# Patient Record
Sex: FEMALE | Race: ASIAN | URBAN - METROPOLITAN AREA
[De-identification: names, ages, dates, MRNs, and addresses within clinical notes are randomized per-mention and may not be internally consistent; named-entity substitution may affect disease eponyms.]

---

## 2019-02-12 ENCOUNTER — PATIENT OUTREACH (OUTPATIENT)
Dept: FAMILY MEDICINE CLINIC | Facility: CLINIC | Age: 59
End: 2019-02-12

## 2019-02-12 NOTE — PROGRESS NOTES
Patient's LOV 03/2013 with Dr. Lizzette Menjivar. Please find out if Dr. Lizzette Menjivar is still patient's PCP- if yes, she needs to follow up to reestablish care. If she is under the care of another PCP- please have Dr. Kaleb Bo name removed from her chart.  Thank you

## 2019-03-13 NOTE — PROGRESS NOTES
Please try to outreach patient again to see if we can get through on phone number in chart. Needs to establish, has been since 2013 since seeing Dr. Katherine Hernandez.

## 2019-03-14 NOTE — PROGRESS NOTES
Pt preferred phone number in chart rang continuously and did not connect to a voicemail. Pt work phone number was for a [employer] store.

## 2021-03-25 ENCOUNTER — IMMUNIZATION (OUTPATIENT)
Dept: LAB | Age: 61
End: 2021-03-25

## 2021-03-25 DIAGNOSIS — Z23 NEED FOR VACCINATION: Primary | ICD-10-CM

## 2021-03-25 PROCEDURE — 0001A COVID 19 PFIZER-BIONTECH: CPT

## 2021-03-25 PROCEDURE — 91300 COVID 19 PFIZER-BIONTECH: CPT

## 2021-04-15 ENCOUNTER — IMMUNIZATION (OUTPATIENT)
Dept: LAB | Age: 61
End: 2021-04-15
Attending: HOSPITALIST

## 2021-04-15 DIAGNOSIS — Z23 NEED FOR VACCINATION: Primary | ICD-10-CM

## 2021-04-15 PROCEDURE — 91300 COVID 19 PFIZER-BIONTECH: CPT | Performed by: NURSE PRACTITIONER

## 2021-04-15 PROCEDURE — 0002A COVID 19 PFIZER-BIONTECH: CPT | Performed by: NURSE PRACTITIONER

## 2022-09-12 ENCOUNTER — OFFICE VISIT (OUTPATIENT)
Dept: FAMILY MEDICINE CLINIC | Facility: CLINIC | Age: 62
End: 2022-09-12
Payer: COMMERCIAL

## 2022-09-12 VITALS
RESPIRATION RATE: 16 BRPM | DIASTOLIC BLOOD PRESSURE: 80 MMHG | OXYGEN SATURATION: 99 % | WEIGHT: 131 LBS | HEART RATE: 85 BPM | BODY MASS INDEX: 22.09 KG/M2 | HEIGHT: 64.5 IN | SYSTOLIC BLOOD PRESSURE: 126 MMHG

## 2022-09-12 DIAGNOSIS — Z13.29 SCREENING FOR ENDOCRINE, METABOLIC AND IMMUNITY DISORDER: ICD-10-CM

## 2022-09-12 DIAGNOSIS — Z71.85 VACCINE COUNSELING: ICD-10-CM

## 2022-09-12 DIAGNOSIS — Z13.0 SCREENING FOR ENDOCRINE, METABOLIC AND IMMUNITY DISORDER: ICD-10-CM

## 2022-09-12 DIAGNOSIS — E55.9 VITAMIN D DEFICIENCY: ICD-10-CM

## 2022-09-12 DIAGNOSIS — H25.9 AGE-RELATED CATARACT OF BOTH EYES, UNSPECIFIED AGE-RELATED CATARACT TYPE: ICD-10-CM

## 2022-09-12 DIAGNOSIS — Z23 NEED FOR VACCINATION: ICD-10-CM

## 2022-09-12 DIAGNOSIS — Z12.31 ENCOUNTER FOR SCREENING MAMMOGRAM FOR MALIGNANT NEOPLASM OF BREAST: ICD-10-CM

## 2022-09-12 DIAGNOSIS — Z00.00 BLOOD TESTS FOR ROUTINE GENERAL PHYSICAL EXAMINATION: ICD-10-CM

## 2022-09-12 DIAGNOSIS — Z12.4 SCREENING FOR CERVICAL CANCER: ICD-10-CM

## 2022-09-12 DIAGNOSIS — Z13.228 SCREENING FOR ENDOCRINE, METABOLIC AND IMMUNITY DISORDER: ICD-10-CM

## 2022-09-12 DIAGNOSIS — Z11.59 ENCOUNTER FOR HCV SCREENING TEST FOR HIGH RISK PATIENT: ICD-10-CM

## 2022-09-12 DIAGNOSIS — Z12.11 SCREENING FOR COLON CANCER: ICD-10-CM

## 2022-09-12 DIAGNOSIS — Z00.00 ROUTINE GENERAL MEDICAL EXAMINATION AT A HEALTH CARE FACILITY: Primary | ICD-10-CM

## 2022-09-12 DIAGNOSIS — E78.5 DYSLIPIDEMIA: ICD-10-CM

## 2022-09-12 DIAGNOSIS — E89.0 POSTOPERATIVE HYPOTHYROIDISM: ICD-10-CM

## 2022-09-12 DIAGNOSIS — Z13.820 SCREENING FOR OSTEOPOROSIS: ICD-10-CM

## 2022-09-12 DIAGNOSIS — Z85.850 HISTORY OF THYROID CANCER: ICD-10-CM

## 2022-09-12 DIAGNOSIS — Z91.89 ENCOUNTER FOR HCV SCREENING TEST FOR HIGH RISK PATIENT: ICD-10-CM

## 2022-09-12 LAB
BILIRUB UR QL STRIP.AUTO: NEGATIVE
CLARITY UR REFRACT.AUTO: CLEAR
COLOR UR AUTO: YELLOW
GLUCOSE UR STRIP.AUTO-MCNC: NEGATIVE MG/DL
KETONES UR STRIP.AUTO-MCNC: NEGATIVE MG/DL
LEUKOCYTE ESTERASE UR QL STRIP.AUTO: NEGATIVE
NITRITE UR QL STRIP.AUTO: NEGATIVE
PH UR STRIP.AUTO: 6 [PH] (ref 5–8)
PROT UR STRIP.AUTO-MCNC: NEGATIVE MG/DL
RBC UR QL AUTO: NEGATIVE
SP GR UR STRIP.AUTO: >=1.03 (ref 1–1.03)
UROBILINOGEN UR STRIP.AUTO-MCNC: 0.2 MG/DL

## 2022-09-12 PROCEDURE — 81003 URINALYSIS AUTO W/O SCOPE: CPT | Performed by: FAMILY MEDICINE

## 2022-09-12 RX ORDER — LEVOTHYROXINE SODIUM 0.1 MG/1
50 TABLET ORAL
COMMUNITY

## 2022-09-15 ENCOUNTER — LABORATORY ENCOUNTER (OUTPATIENT)
Dept: LAB | Age: 62
End: 2022-09-15
Attending: FAMILY MEDICINE
Payer: COMMERCIAL

## 2022-09-15 ENCOUNTER — HOSPITAL ENCOUNTER (OUTPATIENT)
Dept: MAMMOGRAPHY | Age: 62
Discharge: HOME | End: 2022-09-15
Attending: FAMILY MEDICINE
Payer: COMMERCIAL

## 2022-09-15 ENCOUNTER — HOSPITAL ENCOUNTER (OUTPATIENT)
Dept: BONE DENSITY | Age: 62
Discharge: HOME | End: 2022-09-15
Attending: FAMILY MEDICINE
Payer: COMMERCIAL

## 2022-09-15 DIAGNOSIS — E55.9 VITAMIN D DEFICIENCY: ICD-10-CM

## 2022-09-15 DIAGNOSIS — Z91.89 ENCOUNTER FOR HCV SCREENING TEST FOR HIGH RISK PATIENT: ICD-10-CM

## 2022-09-15 DIAGNOSIS — Z12.31 ENCOUNTER FOR SCREENING MAMMOGRAM FOR MALIGNANT NEOPLASM OF BREAST: ICD-10-CM

## 2022-09-15 DIAGNOSIS — Z00.00 BLOOD TESTS FOR ROUTINE GENERAL PHYSICAL EXAMINATION: ICD-10-CM

## 2022-09-15 DIAGNOSIS — Z13.0 SCREENING FOR ENDOCRINE, METABOLIC AND IMMUNITY DISORDER: ICD-10-CM

## 2022-09-15 DIAGNOSIS — Z13.29 SCREENING FOR ENDOCRINE, METABOLIC AND IMMUNITY DISORDER: ICD-10-CM

## 2022-09-15 DIAGNOSIS — Z13.820 SCREENING FOR OSTEOPOROSIS: ICD-10-CM

## 2022-09-15 DIAGNOSIS — Z13.228 SCREENING FOR ENDOCRINE, METABOLIC AND IMMUNITY DISORDER: ICD-10-CM

## 2022-09-15 DIAGNOSIS — Z11.59 ENCOUNTER FOR HCV SCREENING TEST FOR HIGH RISK PATIENT: ICD-10-CM

## 2022-09-15 LAB
ALBUMIN SERPL-MCNC: 3.8 G/DL (ref 3.4–5)
ALBUMIN/GLOB SERPL: 1 {RATIO} (ref 1–2)
ALP LIVER SERPL-CCNC: 54 U/L
ALT SERPL-CCNC: 32 U/L
ANION GAP SERPL CALC-SCNC: 5 MMOL/L (ref 0–18)
AST SERPL-CCNC: 23 U/L (ref 15–37)
BASOPHILS # BLD AUTO: 0.04 X10(3) UL (ref 0–0.2)
BASOPHILS NFR BLD AUTO: 0.7 %
BILIRUB SERPL-MCNC: 0.6 MG/DL (ref 0.1–2)
BUN BLD-MCNC: 16 MG/DL (ref 7–18)
CALCIUM BLD-MCNC: 8.9 MG/DL (ref 8.5–10.1)
CHLORIDE SERPL-SCNC: 105 MMOL/L (ref 98–112)
CHOLEST SERPL-MCNC: 282 MG/DL (ref ?–200)
CO2 SERPL-SCNC: 26 MMOL/L (ref 21–32)
CREAT BLD-MCNC: 0.66 MG/DL
EOSINOPHIL # BLD AUTO: 0.11 X10(3) UL (ref 0–0.7)
EOSINOPHIL NFR BLD AUTO: 1.9 %
ERYTHROCYTE [DISTWIDTH] IN BLOOD BY AUTOMATED COUNT: 14.4 %
FASTING PATIENT LIPID ANSWER: YES
FASTING STATUS PATIENT QL REPORTED: YES
GFR SERPLBLD BASED ON 1.73 SQ M-ARVRAT: 99 ML/MIN/1.73M2 (ref 60–?)
GLOBULIN PLAS-MCNC: 3.9 G/DL (ref 2.8–4.4)
GLUCOSE BLD-MCNC: 86 MG/DL (ref 70–99)
HBV SURFACE AB SER QL: REACTIVE
HBV SURFACE AB SERPL IA-ACNC: >1000 MIU/ML
HCT VFR BLD AUTO: 40.5 %
HCV AB SERPL QL IA: NONREACTIVE
HDLC SERPL-MCNC: 80 MG/DL (ref 40–59)
HGB BLD-MCNC: 13.3 G/DL
IMM GRANULOCYTES # BLD AUTO: 0.01 X10(3) UL (ref 0–1)
IMM GRANULOCYTES NFR BLD: 0.2 %
LDLC SERPL CALC-MCNC: 192 MG/DL (ref ?–100)
LYMPHOCYTES # BLD AUTO: 1.75 X10(3) UL (ref 1–4)
LYMPHOCYTES NFR BLD AUTO: 29.8 %
MCH RBC QN AUTO: 30.9 PG (ref 26–34)
MCHC RBC AUTO-ENTMCNC: 32.8 G/DL (ref 31–37)
MCV RBC AUTO: 94.2 FL
MONOCYTES # BLD AUTO: 0.39 X10(3) UL (ref 0.1–1)
MONOCYTES NFR BLD AUTO: 6.6 %
NEUTROPHILS # BLD AUTO: 3.58 X10 (3) UL (ref 1.5–7.7)
NEUTROPHILS # BLD AUTO: 3.58 X10(3) UL (ref 1.5–7.7)
NEUTROPHILS NFR BLD AUTO: 60.8 %
NONHDLC SERPL-MCNC: 202 MG/DL (ref ?–130)
OSMOLALITY SERPL CALC.SUM OF ELEC: 282 MOSM/KG (ref 275–295)
PLATELET # BLD AUTO: 248 10(3)UL (ref 150–450)
POTASSIUM SERPL-SCNC: 3.8 MMOL/L (ref 3.5–5.1)
PROT SERPL-MCNC: 7.7 G/DL (ref 6.4–8.2)
RBC # BLD AUTO: 4.3 X10(6)UL
RUBV IGG SER QL: POSITIVE
RUBV IGG SER-ACNC: 57.1 IU/ML (ref 10–?)
SODIUM SERPL-SCNC: 136 MMOL/L (ref 136–145)
T4 FREE SERPL-MCNC: 0.8 NG/DL (ref 0.8–1.7)
TRIGL SERPL-MCNC: 65 MG/DL (ref 30–149)
TSI SER-ACNC: 61.6 MIU/ML (ref 0.36–3.74)
VIT D+METAB SERPL-MCNC: 22.3 NG/ML (ref 30–100)
VLDLC SERPL CALC-MCNC: 13 MG/DL (ref 0–30)
WBC # BLD AUTO: 5.9 X10(3) UL (ref 4–11)

## 2022-09-15 PROCEDURE — 86735 MUMPS ANTIBODY: CPT | Performed by: FAMILY MEDICINE

## 2022-09-15 PROCEDURE — 86803 HEPATITIS C AB TEST: CPT | Performed by: FAMILY MEDICINE

## 2022-09-15 PROCEDURE — 80050 GENERAL HEALTH PANEL: CPT | Performed by: FAMILY MEDICINE

## 2022-09-15 PROCEDURE — 86787 VARICELLA-ZOSTER ANTIBODY: CPT | Performed by: FAMILY MEDICINE

## 2022-09-15 PROCEDURE — 86765 RUBEOLA ANTIBODY: CPT | Performed by: FAMILY MEDICINE

## 2022-09-15 PROCEDURE — 77080 DXA BONE DENSITY AXIAL: CPT | Performed by: FAMILY MEDICINE

## 2022-09-15 PROCEDURE — 77067 SCR MAMMO BI INCL CAD: CPT | Performed by: FAMILY MEDICINE

## 2022-09-15 PROCEDURE — 77063 BREAST TOMOSYNTHESIS BI: CPT | Performed by: FAMILY MEDICINE

## 2022-09-15 PROCEDURE — 86762 RUBELLA ANTIBODY: CPT | Performed by: FAMILY MEDICINE

## 2022-09-15 PROCEDURE — 80061 LIPID PANEL: CPT | Performed by: FAMILY MEDICINE

## 2022-09-15 PROCEDURE — 86706 HEP B SURFACE ANTIBODY: CPT | Performed by: FAMILY MEDICINE

## 2022-09-15 PROCEDURE — 84439 ASSAY OF FREE THYROXINE: CPT | Performed by: FAMILY MEDICINE

## 2022-09-15 PROCEDURE — 82306 VITAMIN D 25 HYDROXY: CPT | Performed by: FAMILY MEDICINE

## 2022-09-16 ENCOUNTER — TELEPHONE (OUTPATIENT)
Dept: FAMILY MEDICINE CLINIC | Facility: CLINIC | Age: 62
End: 2022-09-16

## 2022-09-16 DIAGNOSIS — E03.9 HYPOTHYROIDISM, UNSPECIFIED TYPE: Primary | ICD-10-CM

## 2022-09-16 DIAGNOSIS — E55.9 VITAMIN D DEFICIENCY: ICD-10-CM

## 2022-09-16 LAB
MEV IGG SER-ACNC: >300 AU/ML (ref 16.5–?)
MUV IGG SER IA-ACNC: 15.1 AU/ML (ref 11–?)
VZV IGG SER IA-ACNC: >4000 (ref 165–?)

## 2022-09-16 RX ORDER — LEVOTHYROXINE SODIUM 0.1 MG/1
100 TABLET ORAL
Qty: 60 TABLET | Refills: 0 | Status: SHIPPED | OUTPATIENT
Start: 2022-09-16

## 2022-09-16 RX ORDER — ERGOCALCIFEROL 1.25 MG/1
50000 CAPSULE ORAL WEEKLY
Qty: 8 CAPSULE | Refills: 0 | Status: SHIPPED | OUTPATIENT
Start: 2022-09-16

## 2022-09-19 ENCOUNTER — TELEPHONE (OUTPATIENT)
Dept: FAMILY MEDICINE CLINIC | Facility: CLINIC | Age: 62
End: 2022-09-19

## 2022-09-19 RX ORDER — ERGOCALCIFEROL 1.25 MG/1
CAPSULE ORAL
Qty: 12 CAPSULE | Refills: 0 | OUTPATIENT
Start: 2022-09-19

## 2022-09-19 RX ORDER — LEVOTHYROXINE SODIUM 0.1 MG/1
TABLET ORAL
Qty: 90 TABLET | Refills: 0 | OUTPATIENT
Start: 2022-09-19

## 2022-09-19 NOTE — TELEPHONE ENCOUNTER
Pt is calling to discuss the following medication:    levothyroxine 100 MCG Oral Tab    Pt states she has been taking 50mcg per day, not the 100mcg that was prescribed. Pt would like to discuss with nurse.     Thank you

## 2022-09-20 ENCOUNTER — ORDER TRANSCRIPTION (OUTPATIENT)
Dept: ADMINISTRATIVE | Facility: HOSPITAL | Age: 62
End: 2022-09-20

## 2022-09-20 DIAGNOSIS — Z13.9 ENCOUNTER FOR SCREENING: Primary | ICD-10-CM

## 2022-09-23 ENCOUNTER — HOSPITAL ENCOUNTER (OUTPATIENT)
Dept: ULTRASOUND IMAGING | Age: 62
Discharge: HOME | End: 2022-09-23
Attending: FAMILY MEDICINE

## 2022-09-23 DIAGNOSIS — Z13.9 ENCOUNTER FOR SCREENING: ICD-10-CM

## 2022-09-23 NOTE — PROGRESS NOTES
Date of Service 9/23/2022    Zoraida Momin  Date of Birth 8/2/1960    Patient Age: 60year old    PCP: DO Ian Edward [id number]    Consult Type  Type Scan/Screening: PV Screening (Preliminary - Abdominal Aorta Normal)        Left Carotid Artery: Normal (Preliminary)  Right Carotid Artery: Normal (Preliminary)       Body Mass Index  There is no height or weight on file to calculate BMI. Lipid Profile  Cholesterol: 282, done on 9/15/2022. HDL Cholesterol: 80, done on 9/15/2022. LDL Cholesterol: 192, done on 9/15/2022. TriGlycerides 65, done on 9/15/2022. Discussed LDL and the goal is to be less than 100. Diet sounds fairly healthy - excepts when she travels. Decrease Saturated Fats in diet (red meat, dairy, egg yolks, fried food, fast food)  Increased fiber in your diet may help to lower LDL some. She is not on cholesterol medication - Having the Heart Scan done 9/29/22 at [facility] [place]. Due to no RN Consult for the Heart Scan at [facility] [place] - I went over everything today and highlighted our RN Phone number to call with any questions or concerns. [phone]    Nurse Review  Risk factor information and results reviewed with Nurse: Yes       Recommended Follow Up:  Consult your physician regarding[de-identified] Discuss potential for Incidental Finding;Final Peripheral Vascular Stroke Screen Report    No data recorded      Recommendations for Change:  Nutrition Changes: Low Saturated Fat;Low Fat Dairy; Increase Fiber  Cholesterol Modification (goal of therapy depends upon your risk): Decrease LDL (Lousy/Bad) Ideal <100  Exercise: Enhance Current Program  Smoking Cessation: No Change Needed  Weight Management: Maintain Current Weight        Repeat PV Screening: 3 Years       Bright Recommended Resources:  Recommended Resources: Upcoming Classes, Medical Services and [facility]. Health. Lashonda Ho, RN        Please Contact the Nurse Heart Line with any Questions or Concerns [phone].

## 2022-09-23 NOTE — PROGRESS NOTES
Dear Cort Cabot,    Your peripheral vascular screen is within normal limits.     Sincerely,  Dr. Suyapa Guzman

## 2022-09-29 ENCOUNTER — HOSPITAL ENCOUNTER (OUTPATIENT)
Dept: CT IMAGING | Age: 62
Discharge: HOME | End: 2022-09-29
Attending: FAMILY MEDICINE

## 2022-09-29 DIAGNOSIS — Z13.9 ENCOUNTER FOR SCREENING: ICD-10-CM

## 2022-10-04 ENCOUNTER — PATIENT MESSAGE (OUTPATIENT)
Dept: FAMILY MEDICINE CLINIC | Facility: CLINIC | Age: 62
End: 2022-10-04

## 2022-10-27 ENCOUNTER — LAB ENCOUNTER (OUTPATIENT)
Dept: LAB | Age: 62
End: 2022-10-27
Attending: FAMILY MEDICINE
Payer: COMMERCIAL

## 2022-10-27 DIAGNOSIS — E03.9 HYPOTHYROIDISM, UNSPECIFIED TYPE: ICD-10-CM

## 2022-10-27 LAB
T4 FREE SERPL-MCNC: 1.5 NG/DL (ref 0.8–1.7)
TSI SER-ACNC: 1.59 MIU/ML (ref 0.36–3.74)

## 2022-10-27 PROCEDURE — 84439 ASSAY OF FREE THYROXINE: CPT

## 2022-10-27 PROCEDURE — 36415 COLL VENOUS BLD VENIPUNCTURE: CPT

## 2022-10-27 PROCEDURE — 84443 ASSAY THYROID STIM HORMONE: CPT

## 2023-03-06 ENCOUNTER — OFFICE VISIT (OUTPATIENT)
Facility: CLINIC | Age: 63
End: 2023-03-06
Payer: COMMERCIAL

## 2023-03-06 VITALS
BODY MASS INDEX: 21.75 KG/M2 | HEIGHT: 64.5 IN | HEART RATE: 91 BPM | DIASTOLIC BLOOD PRESSURE: 80 MMHG | OXYGEN SATURATION: 97 % | WEIGHT: 129 LBS | RESPIRATION RATE: 16 BRPM | SYSTOLIC BLOOD PRESSURE: 120 MMHG

## 2023-03-06 DIAGNOSIS — R91.8 MULTIPLE PULMONARY NODULES: ICD-10-CM

## 2023-03-06 DIAGNOSIS — J47.9 BRONCHIECTASIS WITHOUT COMPLICATION (HCC): Primary | ICD-10-CM

## 2023-03-06 NOTE — PATIENT INSTRUCTIONS
Please obtain blood work tomorrow  Please obtain a breathing test (pulmonary function test) and high resolution CT scan. Let me know if you develop coughing or breathing trouble. See me in April or May - you can obtain the breathing test and CT scan one day before the appointment.

## 2023-03-07 ENCOUNTER — LAB ENCOUNTER (OUTPATIENT)
Dept: LAB | Facility: REFERENCE LAB | Age: 63
End: 2023-03-07
Attending: FAMILY MEDICINE
Payer: COMMERCIAL

## 2023-03-07 DIAGNOSIS — J47.9 BRONCHIECTASIS WITHOUT COMPLICATION (HCC): Primary | ICD-10-CM

## 2023-03-07 DIAGNOSIS — E55.9 VITAMIN D DEFICIENCY: ICD-10-CM

## 2023-03-07 LAB
IGA SERPL-MCNC: 251 MG/DL (ref 70–312)
IGM SERPL-MCNC: 88.2 MG/DL (ref 43–279)
IMMUNOGLOBULIN PNL SER-MCNC: 1130 MG/DL (ref 791–1643)
RHEUMATOID FACT SERPL-ACNC: <10 IU/ML (ref ?–15)
VIT D+METAB SERPL-MCNC: 47.8 NG/ML (ref 30–100)

## 2023-03-07 PROCEDURE — 86038 ANTINUCLEAR ANTIBODIES: CPT | Performed by: INTERNAL MEDICINE

## 2023-03-07 PROCEDURE — 86480 TB TEST CELL IMMUN MEASURE: CPT | Performed by: INTERNAL MEDICINE

## 2023-03-07 PROCEDURE — 86235 NUCLEAR ANTIGEN ANTIBODY: CPT | Performed by: INTERNAL MEDICINE

## 2023-03-07 PROCEDURE — 86431 RHEUMATOID FACTOR QUANT: CPT | Performed by: INTERNAL MEDICINE

## 2023-03-07 PROCEDURE — 86225 DNA ANTIBODY NATIVE: CPT | Performed by: INTERNAL MEDICINE

## 2023-03-07 PROCEDURE — 82784 ASSAY IGA/IGD/IGG/IGM EACH: CPT | Performed by: INTERNAL MEDICINE

## 2023-03-07 PROCEDURE — 82785 ASSAY OF IGE: CPT | Performed by: INTERNAL MEDICINE

## 2023-03-07 PROCEDURE — 82306 VITAMIN D 25 HYDROXY: CPT | Performed by: FAMILY MEDICINE

## 2023-03-08 LAB
M TB IFN-G CD4+ T-CELLS BLD-ACNC: 0.05 IU/ML
M TB TUBERC IFN-G BLD QL: NEGATIVE
M TB TUBERC IGNF/MITOGEN IGNF CONTROL: >10 IU/ML
QFT TB1 AG MINUS NIL: 0 IU/ML
QFT TB2 AG MINUS NIL: 0.02 IU/ML

## 2023-03-10 LAB
DSDNA IGG SERPL IA-ACNC: <0.6 IU/ML
ENA AB SER QL IA: 0.2 UG/L
ENA AB SER QL IA: NEGATIVE
ENA SCL70 IGG SER IA-ACNC: <0.6 U/ML
IGE SERPL-ACNC: 34 KU/L (ref 2–214)

## 2023-03-14 PROBLEM — D12.5 BENIGN NEOPLASM OF SIGMOID COLON: Status: ACTIVE | Noted: 2023-03-14

## 2023-03-14 PROBLEM — D12.3 BENIGN NEOPLASM OF TRANSVERSE COLON: Status: ACTIVE | Noted: 2023-03-14

## 2023-03-14 PROBLEM — D12.4 BENIGN NEOPLASM OF DESCENDING COLON: Status: ACTIVE | Noted: 2023-03-14

## 2023-03-14 PROBLEM — Z12.11 SPECIAL SCREENING FOR MALIGNANT NEOPLASM OF COLON: Status: ACTIVE | Noted: 2023-03-14

## 2023-03-14 PROBLEM — D12.2 BENIGN NEOPLASM OF ASCENDING COLON: Status: ACTIVE | Noted: 2023-03-14

## 2023-04-07 ENCOUNTER — LAB ENCOUNTER (OUTPATIENT)
Dept: LAB | Age: 63
End: 2023-04-07
Attending: INTERNAL MEDICINE
Payer: COMMERCIAL

## 2023-04-07 DIAGNOSIS — J47.9 BRONCHIECTASIS WITHOUT COMPLICATION (HCC): ICD-10-CM

## 2023-04-08 LAB — SARS-COV-2 RNA RESP QL NAA+PROBE: NOT DETECTED

## 2023-04-10 ENCOUNTER — HOSPITAL ENCOUNTER (OUTPATIENT)
Dept: RESPIRATORY THERAPY | Facility: HOSPITAL | Age: 63
Discharge: HOME | End: 2023-04-10
Attending: INTERNAL MEDICINE
Payer: COMMERCIAL

## 2023-04-10 ENCOUNTER — HOSPITAL ENCOUNTER (OUTPATIENT)
Dept: CT IMAGING | Facility: HOSPITAL | Age: 63
Discharge: HOME | End: 2023-04-10
Attending: INTERNAL MEDICINE
Payer: COMMERCIAL

## 2023-04-10 DIAGNOSIS — J47.9 BRONCHIECTASIS WITHOUT COMPLICATION (HCC): ICD-10-CM

## 2023-04-10 PROCEDURE — 94729 DIFFUSING CAPACITY: CPT

## 2023-04-10 PROCEDURE — 94726 PLETHYSMOGRAPHY LUNG VOLUMES: CPT

## 2023-04-10 PROCEDURE — 94060 EVALUATION OF WHEEZING: CPT

## 2023-04-10 PROCEDURE — 71250 CT THORAX DX C-: CPT | Performed by: INTERNAL MEDICINE

## 2023-04-10 NOTE — PROCEDURES
Pulmonary Function Test Results     Impression: Mildly reduced FEV1. Normal FEV1/FVC ratio. No significant bronchodilator response. Lung volumes and DLCO are normal.    To view the full PFT go to the Procedures tab and select the PFT order and click on the hyperlink under \"Scans on Order\".

## 2023-04-12 ENCOUNTER — OFFICE VISIT (OUTPATIENT)
Facility: CLINIC | Age: 63
End: 2023-04-12
Payer: COMMERCIAL

## 2023-04-12 ENCOUNTER — TELEPHONE (OUTPATIENT)
Facility: CLINIC | Age: 63
End: 2023-04-12

## 2023-04-12 VITALS
DIASTOLIC BLOOD PRESSURE: 60 MMHG | RESPIRATION RATE: 16 BRPM | WEIGHT: 126 LBS | BODY MASS INDEX: 21.25 KG/M2 | HEIGHT: 64.5 IN | SYSTOLIC BLOOD PRESSURE: 108 MMHG | OXYGEN SATURATION: 97 % | HEART RATE: 88 BPM

## 2023-04-12 DIAGNOSIS — R91.8 PULMONARY INFILTRATES: ICD-10-CM

## 2023-04-12 DIAGNOSIS — J47.9 BRONCHIECTASIS WITHOUT ACUTE EXACERBATION (HCC): Primary | ICD-10-CM

## 2023-04-12 DIAGNOSIS — R05.1 ACUTE COUGH: Primary | ICD-10-CM

## 2023-04-12 PROCEDURE — 3078F DIAST BP <80 MM HG: CPT | Performed by: INTERNAL MEDICINE

## 2023-04-12 PROCEDURE — 3008F BODY MASS INDEX DOCD: CPT | Performed by: INTERNAL MEDICINE

## 2023-04-12 PROCEDURE — 99214 OFFICE O/P EST MOD 30 MIN: CPT | Performed by: INTERNAL MEDICINE

## 2023-04-12 PROCEDURE — 3074F SYST BP LT 130 MM HG: CPT | Performed by: INTERNAL MEDICINE

## 2023-04-12 NOTE — PATIENT INSTRUCTIONS
Start using flutter valve at least twice a day  Start using guaifenesin (robitussin or mucinex)  Let me know if your cough persists and we can try using albuterol inhaler to help  Plan on a repeat CT scan in October 2023  Call with questions/concerns

## 2023-04-12 NOTE — TELEPHONE ENCOUNTER
Per Dr. Krissy Lara:  Please send order for flutter valve to DME and ensure they provide education on use. Order sent to [address] and Teresa Londono Rep notified.

## 2023-04-15 ENCOUNTER — LAB ENCOUNTER (OUTPATIENT)
Dept: LAB | Age: 63
End: 2023-04-15
Attending: INTERNAL MEDICINE
Payer: COMMERCIAL

## 2023-04-16 ENCOUNTER — LAB ENCOUNTER (OUTPATIENT)
Dept: LAB | Facility: HOSPITAL | Age: 63
End: 2023-04-16
Attending: INTERNAL MEDICINE
Payer: COMMERCIAL

## 2023-04-16 DIAGNOSIS — R05.1 ACUTE COUGH: ICD-10-CM

## 2023-04-16 PROCEDURE — 87116 MYCOBACTERIA CULTURE: CPT | Performed by: INTERNAL MEDICINE

## 2023-04-16 PROCEDURE — 87206 SMEAR FLUORESCENT/ACID STAI: CPT | Performed by: INTERNAL MEDICINE

## 2023-04-16 PROCEDURE — 87102 FUNGUS ISOLATION CULTURE: CPT | Performed by: INTERNAL MEDICINE

## 2023-04-16 PROCEDURE — 87205 SMEAR GRAM STAIN: CPT | Performed by: INTERNAL MEDICINE

## 2023-04-24 ENCOUNTER — TELEPHONE (OUTPATIENT)
Facility: CLINIC | Age: 63
End: 2023-04-24

## 2023-05-30 LAB
M AVIUM COMPLEX: POSITIVE
M TUBERCULOSIS COMPLEX: NEGATIVE

## 2023-06-21 ENCOUNTER — OFFICE VISIT (OUTPATIENT)
Facility: CLINIC | Age: 63
End: 2023-06-21
Payer: COMMERCIAL

## 2023-06-21 VITALS
BODY MASS INDEX: 21.08 KG/M2 | HEIGHT: 64.5 IN | DIASTOLIC BLOOD PRESSURE: 68 MMHG | SYSTOLIC BLOOD PRESSURE: 100 MMHG | OXYGEN SATURATION: 98 % | HEART RATE: 86 BPM | WEIGHT: 125 LBS | RESPIRATION RATE: 16 BRPM

## 2023-06-21 DIAGNOSIS — R91.8 MULTIPLE PULMONARY NODULES: ICD-10-CM

## 2023-06-21 DIAGNOSIS — R91.8 PULMONARY INFILTRATES: ICD-10-CM

## 2023-06-21 DIAGNOSIS — J47.9 BRONCHIECTASIS WITHOUT ACUTE EXACERBATION (HCC): Primary | ICD-10-CM

## 2023-06-21 PROCEDURE — 3008F BODY MASS INDEX DOCD: CPT | Performed by: INTERNAL MEDICINE

## 2023-06-21 PROCEDURE — 3074F SYST BP LT 130 MM HG: CPT | Performed by: INTERNAL MEDICINE

## 2023-06-21 PROCEDURE — 99214 OFFICE O/P EST MOD 30 MIN: CPT | Performed by: INTERNAL MEDICINE

## 2023-06-21 PROCEDURE — 3078F DIAST BP <80 MM HG: CPT | Performed by: INTERNAL MEDICINE

## 2023-06-22 ENCOUNTER — TELEPHONE (OUTPATIENT)
Facility: CLINIC | Age: 63
End: 2023-06-22

## 2023-06-22 NOTE — TELEPHONE ENCOUNTER
Pt called office to report that she needs a referral for Dr. Althea Warner. Left message for Dr. Liza Armstrong office to call back to discuss whether it is an insurance referral or a letter stating the reason for referral.  Per Dr. Liza Armstrong office, they need pt's diagnosis and reason for visit along with demographic sheet and insurance. Fax to [phone]. Okay to send OV note from yesterday?

## 2023-06-23 ENCOUNTER — TELEPHONE (OUTPATIENT)
Dept: ADMINISTRATIVE | Age: 63
End: 2023-06-23

## 2023-06-23 DIAGNOSIS — R94.2 ABNORMAL LUNG FUNCTION TEST: Primary | ICD-10-CM

## 2023-06-23 DIAGNOSIS — J47.9 BRONCHIECTASIS WITHOUT COMPLICATION (HCC): ICD-10-CM

## 2023-06-23 NOTE — TELEPHONE ENCOUNTER
Received a call from Dr. [facility] office that insurance referral is needed. Pt informed and will call Dr. Radha Leal office to obtain a written referral.  Pt contacted Dr. Radha Leal office and they will work on the referral.  It will take 4-5 days.

## 2023-11-15 ENCOUNTER — TELEPHONE (OUTPATIENT)
Facility: CLINIC | Age: 63
End: 2023-11-15

## 2023-12-06 ENCOUNTER — HOSPITAL ENCOUNTER (OUTPATIENT)
Dept: CT IMAGING | Facility: HOSPITAL | Age: 63
Discharge: HOME | End: 2023-12-06
Attending: INTERNAL MEDICINE
Payer: COMMERCIAL

## 2023-12-06 DIAGNOSIS — R91.8 PULMONARY INFILTRATES: ICD-10-CM

## 2023-12-06 PROCEDURE — 71250 CT THORAX DX C-: CPT | Performed by: INTERNAL MEDICINE

## 2023-12-07 ENCOUNTER — MED REC SCAN ONLY (OUTPATIENT)
Facility: CLINIC | Age: 63
End: 2023-12-07

## 2023-12-10 DIAGNOSIS — E03.9 HYPOTHYROIDISM, UNSPECIFIED TYPE: Primary | ICD-10-CM

## 2023-12-11 DIAGNOSIS — E03.9 HYPOTHYROIDISM, UNSPECIFIED TYPE: ICD-10-CM

## 2023-12-11 RX ORDER — LEVOTHYROXINE SODIUM 0.1 MG/1
100 TABLET ORAL
Qty: 90 TABLET | Refills: 0 | OUTPATIENT
Start: 2023-12-11

## 2023-12-11 RX ORDER — LEVOTHYROXINE SODIUM 0.1 MG/1
100 TABLET ORAL
Qty: 30 TABLET | Refills: 0 | Status: SHIPPED | OUTPATIENT
Start: 2023-12-11

## 2023-12-11 NOTE — TELEPHONE ENCOUNTER
Last office visit: 9/12/2022   Protocol: fail  Requested medication(s) are due for refill today: yes  Requested medication(s) are on the active medication list same strength, form, dose/ sig: yes  Requested medication(s) are managed by provider: yes  Patient has already received a courtsey refill: no

## 2023-12-14 ENCOUNTER — OFFICE VISIT (OUTPATIENT)
Facility: CLINIC | Age: 63
End: 2023-12-14
Payer: COMMERCIAL

## 2023-12-14 VITALS
DIASTOLIC BLOOD PRESSURE: 62 MMHG | RESPIRATION RATE: 18 BRPM | BODY MASS INDEX: 21.59 KG/M2 | OXYGEN SATURATION: 97 % | WEIGHT: 128 LBS | HEIGHT: 64.5 IN | HEART RATE: 78 BPM | SYSTOLIC BLOOD PRESSURE: 110 MMHG

## 2023-12-14 DIAGNOSIS — J47.9 BRONCHIECTASIS WITHOUT ACUTE EXACERBATION (HCC): ICD-10-CM

## 2023-12-14 DIAGNOSIS — R91.8 PULMONARY INFILTRATES: ICD-10-CM

## 2023-12-14 DIAGNOSIS — R91.8 MULTIPLE PULMONARY NODULES: ICD-10-CM

## 2023-12-14 DIAGNOSIS — R91.8 LUNG NODULES: Primary | ICD-10-CM

## 2023-12-14 PROCEDURE — 3008F BODY MASS INDEX DOCD: CPT | Performed by: INTERNAL MEDICINE

## 2023-12-14 PROCEDURE — 3074F SYST BP LT 130 MM HG: CPT | Performed by: INTERNAL MEDICINE

## 2023-12-14 PROCEDURE — 3078F DIAST BP <80 MM HG: CPT | Performed by: INTERNAL MEDICINE

## 2023-12-14 PROCEDURE — 99214 OFFICE O/P EST MOD 30 MIN: CPT | Performed by: INTERNAL MEDICINE

## 2023-12-14 NOTE — PATIENT INSTRUCTIONS
Use the flutter valve - see below. Plan to obtain a CT chest in one year - if you get ill (sinus infection, cold, respiratory illness or other illness) you should postpone the scan for at least 4-6 weeks after you have recovered. Call with questions/concerns      FLUTTER VALVE INSTRUCTIONS  Hold the device in your hand. Stand or sit up tall (this is the ideal position). For the flutter valve, the base must be level with the ground. For the acapella, this is ideal, although not essential.  Take in as deep a breath as you can. Place your mouth on the mouthpiece. Close your lips around the mouthpiece. Exhale into the device. You should feel vibrations in your chest.  Repeat these steps up to 10 times without coughing. This is because each exhalation into the device helps to loosen secretions and move them up your airway. Attempt a cough to help bring up secretion. If your cough is nonproductive, steps 1-8 can be repeated up to 3 times (or more if you are so inclined). This should be done 2-4 times during the course of a day. If you take breathing treatments, the best time to use these devices is after your treatment. This can also be done as needed when you feel like you need to cough but can't seem to bring anything up.

## 2024-01-10 DIAGNOSIS — E03.9 HYPOTHYROIDISM, UNSPECIFIED TYPE: ICD-10-CM

## 2024-01-11 RX ORDER — LEVOTHYROXINE SODIUM 0.1 MG/1
100 TABLET ORAL
Qty: 30 TABLET | Refills: 0 | OUTPATIENT
Start: 2024-01-11

## 2024-01-19 ENCOUNTER — OFFICE VISIT (OUTPATIENT)
Dept: FAMILY MEDICINE CLINIC | Facility: CLINIC | Age: 64
End: 2024-01-19
Payer: COMMERCIAL

## 2024-01-19 VITALS
RESPIRATION RATE: 18 BRPM | SYSTOLIC BLOOD PRESSURE: 104 MMHG | DIASTOLIC BLOOD PRESSURE: 68 MMHG | OXYGEN SATURATION: 100 % | HEIGHT: 65 IN | HEART RATE: 51 BPM | WEIGHT: 134 LBS | BODY MASS INDEX: 22.33 KG/M2

## 2024-01-19 DIAGNOSIS — Z23 NEED FOR VACCINATION: ICD-10-CM

## 2024-01-19 DIAGNOSIS — Z12.4 SCREENING FOR MALIGNANT NEOPLASM OF CERVIX: ICD-10-CM

## 2024-01-19 DIAGNOSIS — E03.9 HYPOTHYROIDISM, UNSPECIFIED TYPE: ICD-10-CM

## 2024-01-19 DIAGNOSIS — Z13.89 SCREENING FOR GENITOURINARY CONDITION: ICD-10-CM

## 2024-01-19 DIAGNOSIS — Z13.820 SCREENING FOR OSTEOPOROSIS: ICD-10-CM

## 2024-01-19 DIAGNOSIS — Z00.00 BLOOD TESTS FOR ROUTINE GENERAL PHYSICAL EXAMINATION: ICD-10-CM

## 2024-01-19 DIAGNOSIS — Z12.31 ENCOUNTER FOR SCREENING MAMMOGRAM FOR MALIGNANT NEOPLASM OF BREAST: Primary | ICD-10-CM

## 2024-01-19 DIAGNOSIS — Z00.00 ROUTINE GENERAL MEDICAL EXAMINATION AT A HEALTH CARE FACILITY: ICD-10-CM

## 2024-01-19 LAB
ALBUMIN SERPL-MCNC: 3.5 G/DL (ref 3.4–5)
ALBUMIN/GLOB SERPL: 0.9 {RATIO} (ref 1–2)
ALP LIVER SERPL-CCNC: 61 U/L
ANION GAP SERPL CALC-SCNC: 5 MMOL/L (ref 0–18)
AST SERPL-CCNC: 20 U/L (ref 15–37)
BASOPHILS # BLD AUTO: 0.04 X10(3) UL (ref 0–0.2)
BASOPHILS NFR BLD AUTO: 0.9 %
BILIRUB SERPL-MCNC: 0.3 MG/DL (ref 0.1–2)
BILIRUB UR QL STRIP.AUTO: NEGATIVE
BUN BLD-MCNC: 13 MG/DL (ref 9–23)
CALCIUM BLD-MCNC: 8.2 MG/DL (ref 8.5–10.1)
CHLORIDE SERPL-SCNC: 109 MMOL/L (ref 98–112)
CHOLEST SERPL-MCNC: 199 MG/DL (ref ?–200)
CLARITY UR REFRACT.AUTO: CLEAR
CO2 SERPL-SCNC: 29 MMOL/L (ref 21–32)
COLOR UR AUTO: COLORLESS
CREAT BLD-MCNC: 0.72 MG/DL
EGFRCR SERPLBLD CKD-EPI 2021: 93 ML/MIN/1.73M2 (ref 60–?)
EOSINOPHIL # BLD AUTO: 0.07 X10(3) UL (ref 0–0.7)
EOSINOPHIL NFR BLD AUTO: 1.5 %
ERYTHROCYTE [DISTWIDTH] IN BLOOD BY AUTOMATED COUNT: 12.9 %
FASTING PATIENT LIPID ANSWER: NO
FASTING STATUS PATIENT QL REPORTED: NO
GLOBULIN PLAS-MCNC: 3.8 G/DL (ref 2.8–4.4)
GLUCOSE BLD-MCNC: 87 MG/DL (ref 70–99)
GLUCOSE UR STRIP.AUTO-MCNC: NORMAL MG/DL
HCT VFR BLD AUTO: 40.3 %
HDLC SERPL-MCNC: 59 MG/DL (ref 40–59)
HGB BLD-MCNC: 13.4 G/DL
IMM GRANULOCYTES # BLD AUTO: 0.01 X10(3) UL (ref 0–1)
IMM GRANULOCYTES NFR BLD: 0.2 %
KETONES UR STRIP.AUTO-MCNC: NEGATIVE MG/DL
LDLC SERPL CALC-MCNC: 123 MG/DL (ref ?–100)
LEUKOCYTE ESTERASE UR QL STRIP.AUTO: NEGATIVE
LYMPHOCYTES # BLD AUTO: 1.5 X10(3) UL (ref 1–4)
LYMPHOCYTES NFR BLD AUTO: 32.8 %
MCH RBC QN AUTO: 31 PG (ref 26–34)
MCHC RBC AUTO-ENTMCNC: 33.3 G/DL (ref 31–37)
MCV RBC AUTO: 93.3 FL
MONOCYTES # BLD AUTO: 0.31 X10(3) UL (ref 0.1–1)
MONOCYTES NFR BLD AUTO: 6.8 %
NEUTROPHILS # BLD AUTO: 2.65 X10 (3) UL (ref 1.5–7.7)
NEUTROPHILS # BLD AUTO: 2.65 X10(3) UL (ref 1.5–7.7)
NEUTROPHILS NFR BLD AUTO: 57.8 %
NITRITE UR QL STRIP.AUTO: NEGATIVE
NONHDLC SERPL-MCNC: 140 MG/DL (ref ?–130)
OSMOLALITY SERPL CALC.SUM OF ELEC: 295 MOSM/KG (ref 275–295)
PH UR STRIP.AUTO: 5 [PH] (ref 5–8)
PLATELET # BLD AUTO: 313 10(3)UL (ref 150–450)
POTASSIUM SERPL-SCNC: 3.8 MMOL/L (ref 3.5–5.1)
PROT SERPL-MCNC: 7.3 G/DL (ref 6.4–8.2)
PROT UR STRIP.AUTO-MCNC: NEGATIVE MG/DL
RBC # BLD AUTO: 4.32 X10(6)UL
SODIUM SERPL-SCNC: 143 MMOL/L (ref 136–145)
SP GR UR STRIP.AUTO: 1.01 (ref 1–1.03)
T4 FREE SERPL-MCNC: 1.4 NG/DL (ref 0.8–1.7)
TRIGL SERPL-MCNC: 94 MG/DL (ref 30–149)
TSI SER-ACNC: 0.14 MIU/ML (ref 0.36–3.74)
UROBILINOGEN UR STRIP.AUTO-MCNC: NORMAL MG/DL
VIT D+METAB SERPL-MCNC: 44.1 NG/ML (ref 30–100)
VLDLC SERPL CALC-MCNC: 17 MG/DL (ref 0–30)
WBC # BLD AUTO: 4.6 X10(3) UL (ref 4–11)

## 2024-01-19 PROCEDURE — 3008F BODY MASS INDEX DOCD: CPT | Performed by: FAMILY MEDICINE

## 2024-01-19 PROCEDURE — 84443 ASSAY THYROID STIM HORMONE: CPT | Performed by: FAMILY MEDICINE

## 2024-01-19 PROCEDURE — 80053 COMPREHEN METABOLIC PANEL: CPT | Performed by: FAMILY MEDICINE

## 2024-01-19 PROCEDURE — 90686 IIV4 VACC NO PRSV 0.5 ML IM: CPT | Performed by: FAMILY MEDICINE

## 2024-01-19 PROCEDURE — 3074F SYST BP LT 130 MM HG: CPT | Performed by: FAMILY MEDICINE

## 2024-01-19 PROCEDURE — 82306 VITAMIN D 25 HYDROXY: CPT | Performed by: FAMILY MEDICINE

## 2024-01-19 PROCEDURE — 3078F DIAST BP <80 MM HG: CPT | Performed by: FAMILY MEDICINE

## 2024-01-19 PROCEDURE — 85025 COMPLETE CBC W/AUTO DIFF WBC: CPT | Performed by: FAMILY MEDICINE

## 2024-01-19 PROCEDURE — 90471 IMMUNIZATION ADMIN: CPT | Performed by: FAMILY MEDICINE

## 2024-01-19 PROCEDURE — 99396 PREV VISIT EST AGE 40-64: CPT | Performed by: FAMILY MEDICINE

## 2024-01-19 PROCEDURE — 84439 ASSAY OF FREE THYROXINE: CPT | Performed by: FAMILY MEDICINE

## 2024-01-19 PROCEDURE — 81001 URINALYSIS AUTO W/SCOPE: CPT | Performed by: FAMILY MEDICINE

## 2024-01-19 PROCEDURE — 80061 LIPID PANEL: CPT | Performed by: FAMILY MEDICINE

## 2024-01-19 PROCEDURE — 88175 CYTOPATH C/V AUTO FLUID REDO: CPT | Performed by: FAMILY MEDICINE

## 2024-01-19 RX ORDER — LEVOTHYROXINE SODIUM 0.1 MG/1
100 TABLET ORAL
Qty: 90 TABLET | Refills: 0 | OUTPATIENT
Start: 2024-01-19

## 2024-01-19 RX ORDER — LEVOTHYROXINE SODIUM 0.1 MG/1
100 TABLET ORAL
Qty: 60 TABLET | Refills: 0 | Status: SHIPPED | OUTPATIENT
Start: 2024-01-19 | End: 2024-01-22

## 2024-01-19 NOTE — H&P
CC: Annual Physical Exam    HPI:   Edenilson Randolph is a 63 year old female who presents for a complete physical exam. Symptoms: is menopausal. Patient complains of nothing.     Wt Readings from Last 6 Encounters:   01/19/24 134 lb (60.8 kg)   12/14/23 128 lb (58.1 kg)   06/21/23 125 lb (56.7 kg)   04/12/23 126 lb (57.2 kg)   03/13/23 124 lb (56.2 kg)   03/06/23 129 lb (58.5 kg)     Body mass index is 22.3 kg/m².     Results for orders placed or performed in visit on 04/16/23   Org ID by Sequencing RFLX AST    Specimen: Sputum; Other   Result Value Ref Range    Org ID by Seq Nocardia farcinica (A)     AFB ID Suscept Testing See reflex.    Miscellaneous Testing   Result Value Ref Range    DELONTE Miscellaneous COMMENT    Acid-fast Organism Identification by DNA Probe and/or Sequencing   Result Value Ref Range    M tuberculosis complex COMMENT     M avium complex COMMENT     M kansasii Not Indicated     M gordonae Not Indicated     Other AFB Id COMMENT    Sputum culture [E]    Specimen: Sputum; Other   Result Value Ref Range    Sputum Culture Result Culture not performed     Sputum Smear       Specimen contaminated with epithelial cells representing oropharyngeal  contamination   AFB Culture(P)    Specimen: Sputum; Other   Result Value Ref Range    AFB Culture AFB from broth only (A)    AFB Smear    Specimen: Sputum; Other   Result Value Ref Range    AFB Smear       No Acid Fast Bacilli observed on concentrated smear.   FUNGUS CULTURE(P)    Specimen: Sputum; Other   Result Value Ref Range    Fungus Culture Result No Fungus Isolated at 4 weeks    Fungus Stain    Specimen: Sputum; Other   Result Value Ref Range    Fungus Smear No Yeast or Fungal elements observed    Acid-fast Organism Identification by DNA Probe and/or Sequencing With Reflex to Susceptibility Testing    Specimen: Sputum; Other   Result Value Ref Range    M kansasii Not Indicated     M gordonae Not Indicated     M avium complex Positive (A)     M  tuberculosis complex Negative     Other AFB Id COMMENT     Susceptibility Testing     Organism ID by Sequencing    Specimen: Other   Result Value Ref Range    Org ID by Seq Nocardia farcinica (A)        Current Outpatient Medications   Medication Sig Dispense Refill    levothyroxine 100 MCG Oral Tab Take 1 tablet (100 mcg total) by mouth before breakfast. 60 tablet 0      No Known Allergies   Past Medical History:   Diagnosis Date    Allergic rhinitis 2015    Spring seasonal allergy    High cholesterol     Hyperlipidemia 2022    Personal history of malignant neoplasm of thyroid     Thyroid disease     Wears glasses       Past Surgical History:   Procedure Laterality Date    OTHER SURGICAL HISTORY  2004    thyroid sx    THYROIDECTOMY        Family History   Problem Relation Age of Onset    Hypertension Mother     Other (CAD) Mother     Diabetes Father     Hypertension Father     Other (prostate cancer) Father     Other (thyroid cancer) Father     Cancer Father         Prostate Cancer    Hypertension Brother     Stroke Maternal Grandfather       Social History:   Social History     Socioeconomic History    Marital status:    Tobacco Use    Smoking status: Never   Substance and Sexual Activity    Alcohol use: Not Currently     Comment: Occasionally or holidays    Drug use: No    Sexual activity: Not Currently     Partners: Male     Occ: retired. : W. Children: .   Exercise:  yoga .  Diet: watches fats closely, watches sugar closely, watches calories closely and almost vegetarian     REVIEW OF SYSTEMS:   GENERAL: feels well otherwise  SKIN: denies any unusual skin lesions  EYES:denies blurred vision or double vision  HEENT: denies nasal congestion, sinus pain or ST  LUNGS: denies shortness of breath with exertion  CARDIOVASCULAR: denies chest pain on exertion  GI: denies abdominal pain,denies heartburn  : denies dysuria, vaginal discharge or itching, in menopause   MUSCULOSKELETAL: denies  back pain  NEURO: denies headaches  PSYCHE: denies depression or anxiety  HEMATOLOGIC: denies hx of anemia  ENDOCRINE: thyroid history  ALL/ASTHMA: denies hx of allergy or asthma    EXAM:   /68 (BP Location: Left arm, Patient Position: Sitting, Cuff Size: adult)   Pulse 51   Resp 18   Ht 5' 5\" (1.651 m)   Wt 134 lb (60.8 kg)   SpO2 100%   BMI 22.30 kg/m²   Body mass index is 22.3 kg/m².   GENERAL: well developed, well nourished,in no apparent distress  SKIN: no rashes,no suspicious lesions  HEENT: atraumatic, normocephalic,ears and throat clear  EYES:PERRLA, EOMI, conjunctiva are clear  NECK: supple,no adenopathy,no bruits, s/p thyroidectomy  CHEST: no chest tenderness  BREAST: no dominant or suspicious mass, no nipple discharge  LUNGS: clear to auscultation  CARDIO: RRR without murmur  GI: good BS's,no masses, HSM or tenderness  :introitus is normal,scant discharge,cervix is pink,no adnexal masses or tenderness, PAP was done ; weak keagle  MUSCULOSKELETAL: back is not tender,FROM of the back  EXTREMITIES: no cyanosis, clubbing or edema  NEURO: Oriented times three,cranial nerves are intact,motor and sensory are grossly intact, 2 + knee reflexes bilaterally  VASCULAR: 2 + dorsalis pedal pulses bilaterally    ASSESSMENT AND PLAN:   Edenilson Randolph is a 63 year old female who presents for a complete physical exam.   Pap and pelvic done.   Order put in for mammogram  Dexascan is UTD. Due 9/2024.  Self breast exam explained.   Health maintenance, will check:   Orders Placed This Encounter   Procedures    CBC W Differential W Platelet [E]    Comp Metabolic Panel (14) [E]    Lipid Panel [E]    TSH [E]    T4 FREE [E] (Free Thyroxine)    Vitamin D [E]    Urinalysis, Routine [E]    Vitamin D, 25-Hydroxy    ThinPrep PAP with HPV Reflex Request       Colonoscopy due in 2026.  Given flu shot.  Advised COVID.  Last eye exam -- 6/2023  Last dental exam -- 6/2023    Pt' s weight is Body mass index is 22.3 kg/m².,  recommended low fat diet and aerobic exercise 30 minutes three times weekly.    The patient indicates understanding of these issues and agrees to the plan.  The patient is asked to return in Return in about 1 year (around 1/19/2025) for physical.  .

## 2024-01-22 DIAGNOSIS — E03.9 HYPOTHYROIDISM, UNSPECIFIED TYPE: ICD-10-CM

## 2024-01-22 RX ORDER — LEVOTHYROXINE SODIUM 88 UG/1
88 TABLET ORAL
Qty: 60 TABLET | Refills: 0 | Status: SHIPPED | OUTPATIENT
Start: 2024-01-22

## 2024-01-22 RX ORDER — LEVOTHYROXINE SODIUM 0.1 MG/1
100 TABLET ORAL
Qty: 60 TABLET | Refills: 0 | Status: CANCELLED | OUTPATIENT
Start: 2024-01-22

## 2024-01-22 RX ORDER — LEVOTHYROXINE SODIUM 0.1 MG/1
100 TABLET ORAL
Qty: 60 TABLET | Refills: 0 | Status: SHIPPED | OUTPATIENT
Start: 2024-01-22

## 2024-01-23 ENCOUNTER — HOSPITAL ENCOUNTER (OUTPATIENT)
Dept: MAMMOGRAPHY | Age: 64
Discharge: HOME | End: 2024-01-23
Attending: FAMILY MEDICINE
Payer: COMMERCIAL

## 2024-01-23 DIAGNOSIS — E03.9 HYPOTHYROIDISM, UNSPECIFIED TYPE: ICD-10-CM

## 2024-01-23 DIAGNOSIS — Z12.31 ENCOUNTER FOR SCREENING MAMMOGRAM FOR MALIGNANT NEOPLASM OF BREAST: ICD-10-CM

## 2024-01-23 PROCEDURE — 77063 BREAST TOMOSYNTHESIS BI: CPT | Performed by: FAMILY MEDICINE

## 2024-01-23 PROCEDURE — 77067 SCR MAMMO BI INCL CAD: CPT | Performed by: FAMILY MEDICINE

## 2024-01-24 LAB
.: NORMAL
.: NORMAL

## 2024-01-24 RX ORDER — LEVOTHYROXINE SODIUM 0.1 MG/1
TABLET ORAL
Qty: 64 TABLET | Refills: 0 | OUTPATIENT
Start: 2024-01-24

## 2024-02-02 RX ORDER — LEVOTHYROXINE SODIUM 88 UG/1
88 TABLET ORAL
Qty: 60 TABLET | Refills: 0 | OUTPATIENT
Start: 2024-02-02

## 2024-03-04 ENCOUNTER — LAB ENCOUNTER (OUTPATIENT)
Dept: LAB | Age: 64
End: 2024-03-04
Attending: FAMILY MEDICINE
Payer: COMMERCIAL

## 2024-03-04 DIAGNOSIS — E03.9 HYPOTHYROIDISM, UNSPECIFIED TYPE: ICD-10-CM

## 2024-03-04 DIAGNOSIS — E03.9 HYPOTHYROIDISM, UNSPECIFIED TYPE: Primary | ICD-10-CM

## 2024-03-04 LAB
T4 FREE SERPL-MCNC: 1.7 NG/DL (ref 0.8–1.7)
TSI SER-ACNC: 0.2 MIU/ML (ref 0.55–4.78)

## 2024-03-04 PROCEDURE — 84443 ASSAY THYROID STIM HORMONE: CPT | Performed by: FAMILY MEDICINE

## 2024-03-04 PROCEDURE — 84439 ASSAY OF FREE THYROXINE: CPT | Performed by: FAMILY MEDICINE

## 2024-03-07 ENCOUNTER — TELEPHONE (OUTPATIENT)
Dept: FAMILY MEDICINE CLINIC | Facility: CLINIC | Age: 64
End: 2024-03-07

## 2024-03-07 RX ORDER — LEVOTHYROXINE SODIUM 88 UG/1
88 TABLET ORAL
Qty: 90 TABLET | Refills: 1 | Status: SHIPPED | OUTPATIENT
Start: 2024-03-07

## 2024-03-07 NOTE — TELEPHONE ENCOUNTER
Pt called office re lab results. Reviewed thyroid lab results and Dr Bojorquez's recommendations to reduce levothyroxine 88mcg daily and stop levothyroxine 100 mcg. Pt is going to [facility] from April 12 through June 11, she will have labs done when she return. Needs Rx for  levothyroxine 88mcg to cover travel.

## 2024-07-11 ENCOUNTER — LAB ENCOUNTER (OUTPATIENT)
Dept: LAB | Facility: REFERENCE LAB | Age: 64
End: 2024-07-11
Attending: FAMILY MEDICINE
Payer: COMMERCIAL

## 2024-07-11 DIAGNOSIS — E03.9 HYPOTHYROIDISM, UNSPECIFIED TYPE: ICD-10-CM

## 2024-07-11 LAB
T4 FREE SERPL-MCNC: 1.7 NG/DL (ref 0.8–1.7)
TSI SER-ACNC: 0.1 MIU/ML (ref 0.55–4.78)

## 2024-07-11 PROCEDURE — 84439 ASSAY OF FREE THYROXINE: CPT

## 2024-07-11 PROCEDURE — 84443 ASSAY THYROID STIM HORMONE: CPT

## 2024-07-11 PROCEDURE — 36415 COLL VENOUS BLD VENIPUNCTURE: CPT

## 2024-07-15 DIAGNOSIS — E89.0 POSTOPERATIVE HYPOTHYROIDISM: ICD-10-CM

## 2024-07-15 DIAGNOSIS — Z85.850 HISTORY OF THYROID CANCER: ICD-10-CM

## 2024-07-15 DIAGNOSIS — E89.0 POSTOPERATIVE HYPOTHYROIDISM: Primary | ICD-10-CM

## 2024-07-15 RX ORDER — LEVOTHYROXINE SODIUM 0.07 MG/1
75 TABLET ORAL
Qty: 90 TABLET | Refills: 0 | Status: SHIPPED | OUTPATIENT
Start: 2024-07-15 | End: 2024-07-15

## 2024-07-15 RX ORDER — LEVOTHYROXINE SODIUM 0.07 MG/1
75 TABLET ORAL
Qty: 90 TABLET | Refills: 1 | Status: SHIPPED | OUTPATIENT
Start: 2024-07-15

## 2024-07-15 NOTE — TELEPHONE ENCOUNTER
Last office visit: 01/19/2024   Protocol: pass    Requested medication(s) are due for refill today: Yes    Requested medication(s) are on the active medication list same strength, form, dose/ sig: Yes    Requested medication(s) are managed by provider: Yes    Patient has already received a courtsey refill: No    NOV: none  Last Labs: 01/19/24  Asked to Return: 1/19/24

## 2024-09-26 ENCOUNTER — HOSPITAL ENCOUNTER (OUTPATIENT)
Dept: BONE DENSITY | Facility: HOSPITAL | Age: 64
Discharge: HOME | End: 2024-09-26
Attending: FAMILY MEDICINE
Payer: MEDICARE

## 2024-09-26 ENCOUNTER — LAB ENCOUNTER (OUTPATIENT)
Dept: LAB | Facility: HOSPITAL | Age: 64
End: 2024-09-26
Attending: FAMILY MEDICINE
Payer: MEDICARE

## 2024-09-26 DIAGNOSIS — Z85.850 HISTORY OF THYROID CANCER: ICD-10-CM

## 2024-09-26 DIAGNOSIS — Z13.820 SCREENING FOR OSTEOPOROSIS: ICD-10-CM

## 2024-09-26 DIAGNOSIS — E89.0 POSTOPERATIVE HYPOTHYROIDISM: ICD-10-CM

## 2024-09-26 LAB
T4 FREE SERPL-MCNC: 1.6 NG/DL (ref 0.8–1.7)
TSI SER-ACNC: 0.64 MIU/ML (ref 0.55–4.78)

## 2024-09-26 PROCEDURE — 77080 DXA BONE DENSITY AXIAL: CPT | Performed by: FAMILY MEDICINE

## 2024-09-26 PROCEDURE — 84443 ASSAY THYROID STIM HORMONE: CPT

## 2024-09-26 PROCEDURE — 36415 COLL VENOUS BLD VENIPUNCTURE: CPT

## 2024-09-26 PROCEDURE — 84439 ASSAY OF FREE THYROXINE: CPT

## 2024-10-01 DIAGNOSIS — Z85.850 HISTORY OF THYROID CANCER: ICD-10-CM

## 2024-10-01 DIAGNOSIS — E89.0 POSTOPERATIVE HYPOTHYROIDISM: ICD-10-CM

## 2024-10-02 ENCOUNTER — OFFICE VISIT (OUTPATIENT)
Dept: FAMILY MEDICINE CLINIC | Facility: CLINIC | Age: 64
End: 2024-10-02
Payer: MEDICARE

## 2024-10-02 VITALS
HEART RATE: 66 BPM | BODY MASS INDEX: 20.66 KG/M2 | DIASTOLIC BLOOD PRESSURE: 66 MMHG | RESPIRATION RATE: 18 BRPM | WEIGHT: 124 LBS | OXYGEN SATURATION: 98 % | SYSTOLIC BLOOD PRESSURE: 108 MMHG | HEIGHT: 65 IN

## 2024-10-02 DIAGNOSIS — Z71.85 VACCINE COUNSELING: ICD-10-CM

## 2024-10-02 DIAGNOSIS — E55.9 VITAMIN D DEFICIENCY: ICD-10-CM

## 2024-10-02 DIAGNOSIS — Z79.899 MEDICATION MANAGEMENT: ICD-10-CM

## 2024-10-02 DIAGNOSIS — Z13.0 SCREENING FOR DEFICIENCY ANEMIA: ICD-10-CM

## 2024-10-02 DIAGNOSIS — M81.0 AGE-RELATED OSTEOPOROSIS WITHOUT CURRENT PATHOLOGICAL FRACTURE: Primary | ICD-10-CM

## 2024-10-02 DIAGNOSIS — E89.0 POSTOPERATIVE HYPOTHYROIDISM: ICD-10-CM

## 2024-10-02 DIAGNOSIS — E78.5 DYSLIPIDEMIA: ICD-10-CM

## 2024-10-02 DIAGNOSIS — Z85.850 HISTORY OF THYROID CANCER: ICD-10-CM

## 2024-10-02 DIAGNOSIS — Z23 NEED FOR VACCINATION: ICD-10-CM

## 2024-10-02 DIAGNOSIS — Z13.89 SCREENING FOR GENITOURINARY CONDITION: ICD-10-CM

## 2024-10-02 RX ORDER — LEVOTHYROXINE SODIUM 75 UG/1
75 TABLET ORAL
Qty: 90 TABLET | Refills: 1 | Status: SHIPPED | OUTPATIENT
Start: 2024-10-02 | End: 2024-10-02

## 2024-10-02 RX ORDER — ALENDRONATE SODIUM 70 MG/1
70 TABLET ORAL
Qty: 4 TABLET | Refills: 3 | Status: SHIPPED | OUTPATIENT
Start: 2024-10-02

## 2024-10-02 RX ORDER — ALENDRONATE SODIUM 70 MG/1
70 TABLET ORAL
Qty: 4 TABLET | Refills: 3 | OUTPATIENT
Start: 2024-10-02

## 2024-10-02 RX ORDER — LEVOTHYROXINE SODIUM 75 UG/1
75 TABLET ORAL
Qty: 90 TABLET | Refills: 1 | Status: SHIPPED | OUTPATIENT
Start: 2024-10-02

## 2024-10-02 NOTE — PROGRESS NOTES
Edenilson Randolph is a 64 year old female.  HPI:   Pt. Is here to discuss her dexa.  Mother fell last year and broke her spine.  Pt. Now has osteoporosis in her lumbar spine and hip.  She never took meds for her bones.  Corrected calcium and Vitamin D are wnl.  She is will to try bisphosphonate -- side effects discussed including osteonecrosis of the jaw.    Current Outpatient Medications   Medication Sig Dispense Refill    levothyroxine 75 MCG Oral Tab Take 1 tablet (75 mcg total) by mouth before breakfast. 90 tablet 1     No current facility-administered medications for this visit.      No Known Allergies   Past Medical History:    Allergic rhinitis    Spring seasonal allergy    High cholesterol    Hyperlipidemia    Personal history of malignant neoplasm of thyroid    Thyroid disease    Wears glasses      Social History:  Social History     Socioeconomic History    Marital status:    Tobacco Use    Smoking status: Never   Substance and Sexual Activity    Alcohol use: Not Currently     Comment: Occasionally or holidays    Drug use: No    Sexual activity: Not Currently     Partners: Male        Results for orders placed or performed in visit on 09/26/24   TSH and Free T4 [E]   Result Value Ref Range    Free T4 1.6 0.8 - 1.7 ng/dL    TSH 0.636 0.550 - 4.780 mIU/mL       REVIEW OF SYSTEMS:   GENERAL: feels well otherwise  SKIN: denies any unusual skin lesions  LUNGS: denies shortness of breath with exertion  CARDIOVASCULAR: denies chest pain on exertion  GI: denies abdominal pain,denies heartburn  MUSCULOSKELETAL: denies back pain  EXTREMITIES:  No pain or numbness    EXAM:   /66 (BP Location: Left arm, Patient Position: Sitting, Cuff Size: adult)   Pulse 66   Resp 18   Ht 5' 5\" (1.651 m)   Wt 124 lb (56.2 kg)   SpO2 98%   BMI 20.63 kg/m²   GENERAL: well developed, well nourished,in no apparent distress  SKIN: no rashes,no suspicious lesions  HEENT: atraumatic, normocephalic,ears and throat are  clear  NECK: supple,no adenopathy,no bruits  LUNGS: clear to auscultation  CARDIO: RRR without murmur  GI: soft, good BS's  EXTREMITIES: no cyanosis, clubbing or edema    ASSESSMENT AND PLAN:     Encounter Diagnoses   Name Primary?    Age-related osteoporosis without current pathological fracture Yes    Postoperative hypothyroidism     History of thyroid cancer     Vitamin D deficiency     Dyslipidemia     Screening for genitourinary condition     Screening for deficiency anemia     Medication management     Vaccine counseling     Need for vaccination        Orders Placed This Encounter   Procedures    CBC With Differential With Platelet    Comp Metabolic Panel (14)    Lipid Panel    Vitamin D, 25-Hydroxy    Free T4, (Free Thyroxine)    Assay, Thyroid Stim Hormone    Urinalysis, Routine    Prevnar 20 (PCV20) [23543]       Meds & Refills for this Visit:  Requested Prescriptions     Signed Prescriptions Disp Refills    levothyroxine 75 MCG Oral Tab 90 tablet 1     Sig: Take 1 tablet (75 mcg total) by mouth before breakfast.    alendronate 70 MG Oral Tab 4 tablet 3     Sig: Take 1 tablet (70 mg total) by mouth every 7 days.       Imaging & Consults:  PCV20 VACCINE FOR INTRAMUSCULAR USE    Given flu and prevnar 20 today.  Will start alendronate.  Labs ordered.  Due for Welcome to Medicare.  Meds refilled.    Labs reviewed.    The patient indicates understanding of these issues and agrees to the plan.  Return in about 3 months (around 1/2/2025) for AWV/Med check, med check 30  - - Welcome to Medicare.

## 2024-12-20 ENCOUNTER — TELEPHONE (OUTPATIENT)
Facility: CLINIC | Age: 64
End: 2024-12-20

## 2024-12-20 DIAGNOSIS — R91.8 PULMONARY INFILTRATES: ICD-10-CM

## 2024-12-20 DIAGNOSIS — R91.8 LUNG NODULES: Primary | ICD-10-CM

## 2024-12-20 DIAGNOSIS — J47.9 BRONCHIECTASIS WITHOUT ACUTE EXACERBATION (HCC): ICD-10-CM

## 2024-12-20 DIAGNOSIS — R91.8 MULTIPLE PULMONARY NODULES: ICD-10-CM

## 2024-12-20 NOTE — TELEPHONE ENCOUNTER
CT chest no CAD  on 24.  Order reentered.  Call placed to central scheduling and spoke with Kendra.  She will call the pt to schedule.

## 2025-01-06 ENCOUNTER — LAB ENCOUNTER (OUTPATIENT)
Dept: LAB | Facility: REFERENCE LAB | Age: 65
End: 2025-01-06
Attending: FAMILY MEDICINE
Payer: MEDICARE

## 2025-01-06 DIAGNOSIS — E89.0 POSTOPERATIVE HYPOTHYROIDISM: ICD-10-CM

## 2025-01-06 DIAGNOSIS — Z13.89 SCREENING FOR GENITOURINARY CONDITION: ICD-10-CM

## 2025-01-06 DIAGNOSIS — Z13.0 SCREENING FOR DEFICIENCY ANEMIA: ICD-10-CM

## 2025-01-06 DIAGNOSIS — Z79.899 MEDICATION MANAGEMENT: ICD-10-CM

## 2025-01-06 DIAGNOSIS — E55.9 VITAMIN D DEFICIENCY: ICD-10-CM

## 2025-01-06 DIAGNOSIS — E78.5 DYSLIPIDEMIA: ICD-10-CM

## 2025-01-06 LAB
ALBUMIN SERPL-MCNC: 4.4 G/DL (ref 3.2–4.8)
ALBUMIN/GLOB SERPL: 1.5 {RATIO} (ref 1–2)
ALP LIVER SERPL-CCNC: 58 U/L
ALT SERPL-CCNC: 16 U/L
ANION GAP SERPL CALC-SCNC: 8 MMOL/L (ref 0–18)
AST SERPL-CCNC: 20 U/L (ref ?–34)
BASOPHILS # BLD AUTO: 0.04 X10(3) UL (ref 0–0.2)
BASOPHILS NFR BLD AUTO: 0.9 %
BILIRUB SERPL-MCNC: 0.8 MG/DL (ref 0.2–1.1)
BILIRUB UR QL: NEGATIVE
BUN BLD-MCNC: 17 MG/DL (ref 9–23)
BUN/CREAT SERPL: 23.9 (ref 10–20)
CALCIUM BLD-MCNC: 9.7 MG/DL (ref 8.7–10.4)
CHLORIDE SERPL-SCNC: 105 MMOL/L (ref 98–112)
CHOLEST SERPL-MCNC: 257 MG/DL (ref ?–200)
CLARITY UR: CLEAR
CO2 SERPL-SCNC: 29 MMOL/L (ref 21–32)
CREAT BLD-MCNC: 0.71 MG/DL
DEPRECATED RDW RBC AUTO: 47.9 FL (ref 35.1–46.3)
EGFRCR SERPLBLD CKD-EPI 2021: 94 ML/MIN/1.73M2 (ref 60–?)
EOSINOPHIL # BLD AUTO: 0.04 X10(3) UL (ref 0–0.7)
EOSINOPHIL NFR BLD AUTO: 0.9 %
ERYTHROCYTE [DISTWIDTH] IN BLOOD BY AUTOMATED COUNT: 13.5 % (ref 11–15)
FASTING PATIENT LIPID ANSWER: YES
FASTING STATUS PATIENT QL REPORTED: YES
GLOBULIN PLAS-MCNC: 2.9 G/DL (ref 2–3.5)
GLUCOSE BLD-MCNC: 93 MG/DL (ref 70–99)
GLUCOSE UR-MCNC: NORMAL MG/DL
HCT VFR BLD AUTO: 41.2 %
HDLC SERPL-MCNC: 64 MG/DL (ref 40–59)
HGB BLD-MCNC: 13.4 G/DL
HGB UR QL STRIP.AUTO: NEGATIVE
IMM GRANULOCYTES # BLD AUTO: 0.01 X10(3) UL (ref 0–1)
IMM GRANULOCYTES NFR BLD: 0.2 %
KETONES UR-MCNC: NEGATIVE MG/DL
LDLC SERPL CALC-MCNC: 182 MG/DL (ref ?–100)
LEUKOCYTE ESTERASE UR QL STRIP.AUTO: 25
LYMPHOCYTES # BLD AUTO: 1.49 X10(3) UL (ref 1–4)
LYMPHOCYTES NFR BLD AUTO: 32.7 %
MCH RBC QN AUTO: 30.9 PG (ref 26–34)
MCHC RBC AUTO-ENTMCNC: 32.5 G/DL (ref 31–37)
MCV RBC AUTO: 95.2 FL
MONOCYTES # BLD AUTO: 0.31 X10(3) UL (ref 0.1–1)
MONOCYTES NFR BLD AUTO: 6.8 %
NEUTROPHILS # BLD AUTO: 2.66 X10 (3) UL (ref 1.5–7.7)
NEUTROPHILS # BLD AUTO: 2.66 X10(3) UL (ref 1.5–7.7)
NEUTROPHILS NFR BLD AUTO: 58.5 %
NITRITE UR QL STRIP.AUTO: NEGATIVE
NONHDLC SERPL-MCNC: 193 MG/DL (ref ?–130)
OSMOLALITY SERPL CALC.SUM OF ELEC: 295 MOSM/KG (ref 275–295)
PH UR: 6 [PH] (ref 5–8)
PLATELET # BLD AUTO: 273 10(3)UL (ref 150–450)
POTASSIUM SERPL-SCNC: 3.7 MMOL/L (ref 3.5–5.1)
PROT SERPL-MCNC: 7.3 G/DL (ref 5.7–8.2)
PROT UR-MCNC: NEGATIVE MG/DL
RBC # BLD AUTO: 4.33 X10(6)UL
SODIUM SERPL-SCNC: 142 MMOL/L (ref 136–145)
SP GR UR STRIP: 1.02 (ref 1–1.03)
T4 FREE SERPL-MCNC: 1.5 NG/DL (ref 0.8–1.7)
TRIGL SERPL-MCNC: 70 MG/DL (ref 30–149)
TSI SER-ACNC: 6.61 UIU/ML (ref 0.55–4.78)
UROBILINOGEN UR STRIP-ACNC: NORMAL
VIT D+METAB SERPL-MCNC: 38.1 NG/ML (ref 30–100)
VLDLC SERPL CALC-MCNC: 14 MG/DL (ref 0–30)
WBC # BLD AUTO: 4.6 X10(3) UL (ref 4–11)

## 2025-01-06 PROCEDURE — 80053 COMPREHEN METABOLIC PANEL: CPT

## 2025-01-06 PROCEDURE — 85025 COMPLETE CBC W/AUTO DIFF WBC: CPT

## 2025-01-06 PROCEDURE — 36415 COLL VENOUS BLD VENIPUNCTURE: CPT

## 2025-01-06 PROCEDURE — 81001 URINALYSIS AUTO W/SCOPE: CPT

## 2025-01-06 PROCEDURE — 84439 ASSAY OF FREE THYROXINE: CPT

## 2025-01-06 PROCEDURE — 82306 VITAMIN D 25 HYDROXY: CPT

## 2025-01-06 PROCEDURE — 84443 ASSAY THYROID STIM HORMONE: CPT

## 2025-01-06 PROCEDURE — 80061 LIPID PANEL: CPT

## 2025-01-07 ENCOUNTER — HOSPITAL ENCOUNTER (OUTPATIENT)
Dept: CT IMAGING | Facility: HOSPITAL | Age: 65
Discharge: HOME | End: 2025-01-07
Attending: INTERNAL MEDICINE
Payer: MEDICARE

## 2025-01-07 DIAGNOSIS — J47.9 BRONCHIECTASIS WITHOUT ACUTE EXACERBATION (HCC): ICD-10-CM

## 2025-01-07 DIAGNOSIS — R91.8 PULMONARY INFILTRATES: ICD-10-CM

## 2025-01-07 DIAGNOSIS — R91.8 LUNG NODULES: ICD-10-CM

## 2025-01-07 DIAGNOSIS — R91.8 MULTIPLE PULMONARY NODULES: ICD-10-CM

## 2025-01-07 PROCEDURE — 71250 CT THORAX DX C-: CPT | Performed by: INTERNAL MEDICINE

## 2025-01-13 ENCOUNTER — OFFICE VISIT (OUTPATIENT)
Dept: FAMILY MEDICINE CLINIC | Facility: CLINIC | Age: 65
End: 2025-01-13
Payer: MEDICARE

## 2025-01-13 VITALS
DIASTOLIC BLOOD PRESSURE: 62 MMHG | SYSTOLIC BLOOD PRESSURE: 104 MMHG | OXYGEN SATURATION: 100 % | HEART RATE: 72 BPM | RESPIRATION RATE: 16 BRPM | WEIGHT: 124.25 LBS | HEIGHT: 64.15 IN | BODY MASS INDEX: 21.21 KG/M2

## 2025-01-13 DIAGNOSIS — E55.9 VITAMIN D DEFICIENCY: ICD-10-CM

## 2025-01-13 DIAGNOSIS — Z00.00 ENCOUNTER FOR ANNUAL HEALTH EXAMINATION: Primary | ICD-10-CM

## 2025-01-13 DIAGNOSIS — E89.0 POSTOPERATIVE HYPOTHYROIDISM: ICD-10-CM

## 2025-01-13 DIAGNOSIS — J47.9 BRONCHIECTASIS WITHOUT COMPLICATION (HCC): ICD-10-CM

## 2025-01-13 DIAGNOSIS — H25.9 AGE-RELATED CATARACT OF BOTH EYES, UNSPECIFIED AGE-RELATED CATARACT TYPE: ICD-10-CM

## 2025-01-13 DIAGNOSIS — E78.5 DYSLIPIDEMIA: ICD-10-CM

## 2025-01-13 DIAGNOSIS — Z12.31 SCREENING MAMMOGRAM FOR BREAST CANCER: ICD-10-CM

## 2025-01-13 DIAGNOSIS — R91.8 PULMONARY NODULES: ICD-10-CM

## 2025-01-13 DIAGNOSIS — M81.0 AGE-RELATED OSTEOPOROSIS WITHOUT CURRENT PATHOLOGICAL FRACTURE: ICD-10-CM

## 2025-01-13 DIAGNOSIS — Z71.85 VACCINE COUNSELING: ICD-10-CM

## 2025-01-13 DIAGNOSIS — Z85.850 HISTORY OF THYROID CANCER: ICD-10-CM

## 2025-01-13 DIAGNOSIS — Z79.899 MEDICATION MANAGEMENT: ICD-10-CM

## 2025-01-13 DIAGNOSIS — Z12.4 SCREENING FOR MALIGNANT NEOPLASM OF CERVIX: ICD-10-CM

## 2025-01-13 DIAGNOSIS — Z86.0100 HISTORY OF COLON POLYPS: ICD-10-CM

## 2025-01-13 DIAGNOSIS — Z78.0 MENOPAUSE: ICD-10-CM

## 2025-01-13 LAB
ATRIAL RATE: 57 BPM
P AXIS: -24 DEGREES
P-R INTERVAL: 158 MS
Q-T INTERVAL: 436 MS
QRS DURATION: 82 MS
QTC CALCULATION (BEZET): 424 MS
R AXIS: 80 DEGREES
T AXIS: 33 DEGREES
VENTRICULAR RATE: 57 BPM

## 2025-01-13 PROCEDURE — 88175 CYTOPATH C/V AUTO FLUID REDO: CPT | Performed by: FAMILY MEDICINE

## 2025-01-13 RX ORDER — LEVOTHYROXINE SODIUM 75 UG/1
75 TABLET ORAL
Qty: 90 TABLET | Refills: 1 | Status: SHIPPED | OUTPATIENT
Start: 2025-01-13

## 2025-01-13 NOTE — H&P
Subjective:   Edenilson Randolph is a 64 year old female who presents for a IPPE (Initial Preventative Physical Exam) (Welcome to Medicare- < 12 months on Medicare) and scheduled follow up of multiple significant but stable problems.   Osteoporosis -- did not take alendronate; dexa UTD; circular trampoline; walking; weights; calcium and vitamin D in diet  Dyslipidemia -- staying active  Vitamin D def -- stable; recent labs reviewed  Bronchiectasis/pulmonary nodules -- stable; followed by CT; no meds; Dr. Kline  Postop hypothyroid -- TSH elevated but T4 wnl  Cataracts --  per optho  Last eye exam -- 2024    Last dental exam -- 2024    History/Other:   Fall Risk Assessment:   She has been screened for Falls and is low risk.      Cognitive Assessment:   She had a completely normal cognitive assessment - see flowsheet entries       Functional Ability/Status:   Edenilson Randolph has a completely normal functional assessment. See flowsheet for details.      Depression Screening (PHQ):  PHQ-2 SCORE: 0  , done 1/12/2025   If you checked off any problems, how difficult have these problems made it for you to do your work, take care of things at home, or get along with other people?: Not difficult at all    Last [place] Suicide Screening on 1/13/2025 was No Risk.     5 minutes spent screening and counseling for depression    Advanced Directives:   She does have a Living Will but we do NOT have it on file in Epic.    She does NOT have a Power of  for Health Care. [Do you have a healthcare power of ?: (Patient-Rptd) No]  Discussed Advance Care Planning with patient (and family/surrogate if present). Standard forms made available to patient in After Visit Summary.      Patient Active Problem List   Diagnosis    Vitamin D deficiency    Disorder of bone and cartilage, unspecified    Routine gynecological examination    Screening for other and unspecified cardiovascular conditions    Other screening mammogram     Unspecified hypothyroidism    Screening for other and unspecified endocrine, nutritional, metabolic, and immunity disorders    Screening for other and unspecified genitourinary condition    Screening for iron deficiency anemia    Screening for lipoid disorders    Dyslipidemia    Postoperative hypothyroidism    History of thyroid cancer    Age-related cataract of both eyes    Special screening for malignant neoplasm of colon    Benign neoplasm of ascending colon    Benign neoplasm of transverse colon    Benign neoplasm of descending colon    Benign neoplasm of sigmoid colon    Bronchiectasis without complication (HCC)     Allergies:  She has No Known Allergies.    Current Medications:  Outpatient Medications Marked as Taking for the 1/13/25 encounter (Office Visit) with Mónica Bojorquez DO   Medication Sig    levothyroxine 75 MCG Oral Tab Take 1 tablet (75 mcg total) by mouth before breakfast.       Medical History:  She  has a past medical history of Allergic rhinitis (2015), High cholesterol, Hyperlipidemia (Sept 2022), Personal history of malignant neoplasm of thyroid, Thyroid disease, and Wears glasses.  Surgical History:  She  has a past surgical history that includes other surgical history (01/01/2004) and thyroidectomy.   Family History:  Her family history includes CAD in her mother; Cancer in her father; Diabetes in her father; Hypertension in her brother, father, and mother; Stroke in her maternal grandfather; prostate cancer in her father; thyroid cancer in her father.  Social History:  She  reports that she has never smoked. She has never used smokeless tobacco. She reports that she does not currently use alcohol. She reports that she does not use drugs.    Tobacco:  She has never smoked tobacco.    CAGE Alcohol Screen:   CAGE screening score of 0 on 1/12/2025, showing low risk of alcohol abuse.      Patient Care Team:  Mónica Bojorquez DO as PCP - General (Family Practice)  Mónica Bojorquez DO ([facility]  Practice)    Review of Systems  GENERAL: feels well otherwise  SKIN: denies any unusual skin lesions  EYES: denies blurred vision or double vision  HEENT: denies nasal congestion, sinus pain or ST  LUNGS: denies shortness of breath with exertion  CARDIOVASCULAR: denies chest pain on exertion  GI: denies abdominal pain, denies heartburn  : denies dysuria, vaginal discharge or itching, no complaint of urinary incontinence   MUSCULOSKELETAL: denies back pain  NEURO: denies headaches  PSYCHE: denies depression or anxiety  HEMATOLOGIC: denies hx of anemia  ENDOCRINE: thyroid history  ALL/ASTHMA: denies hx of allergy or asthma    Objective:   Physical Exam  General Appearance:  Alert, cooperative, no distress, appears stated age   Head:  Normocephalic, without obvious abnormality, atraumatic   Eyes:  Conjunctiva/corneas clear, EOM's intact both eyes   Ears:  Normal TM's and external ear canals, both ears   Nose: Nares normal, septum midline,mucosa normal, no drainage or sinus tenderness   Throat: Lips, mucosa, and tongue normal; teeth and gums normal   Neck: Supple, symmetrical, trachea midline, no adenopathy;  thyroid: not enlarged, symmetric, no tenderness/mass/nodules; no carotid bruit or JVD   Back:   Symmetric, no curvature, ROM normal, no CVA tenderness   Lungs:   Clear to auscultation bilaterally, respirations unlabored   Heart:  Regular rate and rhythm, S1 and S2 normal, no murmur, rub, or gallop   Abdomen:   Soft, non-tender, bowel sounds active all four quadrants,  no masses, no organomegaly   Pelvic: SEE BELOW   Extremities: Extremities normal, atraumatic, no cyanosis or edema   Pulses: 2+ and symmetric   Skin: Skin color, texture, turgor normal, no rashes or lesions   Lymph nodes: Cervical, supraclavicular, and axillary nodes normal   Neurologic: Normal   , Breasts:  normal appearance, no masses or tenderness, and Pelvic: cervix normal in appearance, external genitalia normal, no adnexal masses or  tenderness, no cervical motion tenderness, rectovaginal septum normal, uterus normal size, shape, and consistency, and vagina normal without discharge    /62 (BP Location: Left arm, Patient Position: Sitting, Cuff Size: adult)   Pulse 72   Resp 16   Ht 5' 4.15\" (1.629 m)   Wt 124 lb 4 oz (56.4 kg)   SpO2 100%   BMI 21.23 kg/m²  Estimated body mass index is 21.23 kg/m² as calculated from the following:    Height as of this encounter: 5' 4.15\" (1.629 m).    Weight as of this encounter: 124 lb 4 oz (56.4 kg).    Medicare Hearing Assessment:   Hearing Screening    Time taken: 1/13/2025 10:09 AM  Entry User: Alyssa Wise MA  Screening Method: Finger Rub  Finger Rub Result: Pass         Visual Acuity:   Right Eye Visual Acuity: Corrected Right Eye Chart Acuity: 20/30   Left Eye Visual Acuity: Corrected Left Eye Chart Acuity: 20/30   Both Eyes Visual Acuity: Corrected Both Eyes Chart Acuity: 20/20   Able To Tolerate Visual Acuity: Yes        Assessment & Plan:   Edenilson Randolph is a 64 year old female who presents for a Medicare Assessment.     1. Encounter for annual health examination (Primary)  -     EKG in office, for IPPE (Welcome to Medicare/ Initial Preventative Exam) for new MUSE system  2. Age-related osteoporosis without current pathological fracture  -     Detailed, Mod Complex (97523)  3. Postoperative hypothyroidism  -     Assay, Thyroid Stim Hormone; Future; Expected date: 02/24/2025  -     Free T4, (Free Thyroxine); Future; Expected date: 02/24/2025  -     Levothyroxine Sodium; Take 1 tablet (75 mcg total) by mouth before breakfast.  Dispense: 90 tablet; Refill: 1  -     Detailed, Mod Complex (58221)  4. History of thyroid cancer  -     Levothyroxine Sodium; Take 1 tablet (75 mcg total) by mouth before breakfast.  Dispense: 90 tablet; Refill: 1  -     Detailed, Mod Complex (62325)  5. Dyslipidemia  -     Detailed, Mod Complex (20838)  6. Vitamin D deficiency  -     Detailed, Mod Complex  (32692)  7. Pulmonary nodules  -     Detailed, Mod Complex (31001)  8. Bronchiectasis without complication (HCC)  -     Detailed, Mod Complex (60477)  9. Age-related cataract of both eyes, unspecified age-related cataract type  -     Detailed, Mod Complex (11783)  10. History of colon polyps  -     Detailed, Mod Complex (24700)  11. Medication management  -     Detailed, Mod Complex (35561)  12. Vaccine counseling  -     Detailed Mod Complex (99214)  13. Screening mammogram for breast cancer  -     [facility] MARÍA ELENA 2D+3D SCREENING BILAT (CPT=77067/75391); Future; Expected date: 01/24/2025  -     Detailed, Mod Complex (44674)  14. Screening for malignant neoplasm of cervix  -     Detailed Mod Complex (29065)  -     ThinPrep PAP with HPV Reflex Request B; Future; Expected date: 01/13/2025  -     ThinPrep PAP with HPV Reflex Request B  15. Menopause  -     Detailed Mod Complex (99214)  -     XR DEXA BONE DENSITOMETRY (CPT=77080); Future; Expected date: 10/01/2025    The patient indicates understanding of these issues and agrees to the plan.  Reinforced healthy diet, lifestyle, and exercise.      1. Encounter for annual health examination  Done today.  - EKG in office, for IPPE (Welcome to Medicare/ Initial Preventative Exam) for new MUSE system    2. Age-related osteoporosis without current pathological fracture  Deferred bisphosphonate.  - Benita Mod Complex (65717)    3. Postoperative hypothyroidism  Repeat labs in 6 weeks.    - Assay, Thyroid Stim Hormone; Future  - Free T4, (Free Thyroxine); Future  - levothyroxine 75 MCG Oral Tab; Take 1 tablet (75 mcg total) by mouth before breakfast.  Dispense: 90 tablet; Refill: 1  - Detailed, Mod Complex (43096)    4. History of thyroid cancer  Stable; CPM  - levothyroxine 75 MCG Oral Tab; Take 1 tablet (75 mcg total) by mouth before breakfast.  Dispense: 90 tablet; Refill: 1  - Detailed, Mod Complex (23036)    5. Dyslipidemia  Stable. Labs reviewed  - Detailed Mod Complex  (99214)    6. Vitamin D deficiency  Stable. Labs reviewed  - Detailed, Mod Complex (99214)    7. Pulmonary nodules  Stable. Will see Dr. Kline on Wednesday; had repeat CT chest; no meds  - Detailed, Mod Complex (99214)    8. Bronchiectasis without complication (HCC)  Stable. Will see Dr. Kline on Wednesday; had repeat CT chest; no meds  - Detailed, Mod Complex (99214)    9. Age-related cataract of both eyes, unspecified age-related cataract type  Per optho  - Detailed, Mod Complex (99214)    10. History of colon polyps  Colonoscopy due in 2026   - Detailed, Mod Complex (99214)    11. Medication management  reviewed  - Detailed, Mod Complex (99214)    12. Vaccine counseling  reviewed  - Detailed, Mod Complex (99214)    13. Screening mammogram for breast cancer  Mammo ordered.  - [facility] MARÍA ELENA 2D+3D SCREENING BILAT (CPT=77067/01801); Future  - Detailed, Mod Complex (99214)    14. Screening for malignant neoplasm of cervix  Pap done.  - Detailed, Mod Complex (99214)  - ThinPrep PAP with HPV Reflex Request; Future    15. Menopause  Dexa ordered for later this year.  Deferred alendronate.  Focusing on exercise, calcium and vitamin D.    - Detailed, Mod Complex (99214)  - XR DEXA BONE DENSITOMETRY (CPT=77080); Future        Return in 1 year (on 1/13/2026) for AWV/Med check.     Mónica Bojorquez DO, 1/13/2025     Supplementary Documentation:   General Health:  In the past six months, have you lost more than 10 pounds without trying?: (Patient-Rptd) 2 - No  Has your appetite been poor?: (Patient-Rptd) No  Type of Diet: (Patient-Rptd) Balanced  How does the patient maintain a good energy level?: (Patient-Rptd) Stretching  How would you describe your daily physical activity?: (Patient-Rptd) Light  How would you describe your current health state?: (Patient-Rptd) Good  How do you maintain positive mental well-being?: (Patient-Rptd) Visiting Family  On a scale of 0 to 10, with 0 being no pain and 10 being severe pain, what is your  pain level?: (Patient-Rptd) 0 - (None)  In the past six months, have you experienced urine leakage?: (Patient-Rptd) 0-No  At any time do you feel concerned for the safety/well-being of yourself and/or your children, in your home or elsewhere?: (Patient-Rptd) No  Have you had any immunizations at another office such as Influenza, Hepatitis B, Tetanus, or Pneumococcal?: (Patient-Rptd) No    Health Maintenance   Topic Date Due    COVID-19 Vaccine (6 - 2024-25 season) 09/01/2024    Annual Depression Screening  01/01/2025    Annual Physical  01/19/2025    Mammogram  01/23/2025    Colorectal Cancer Screening  03/14/2026    Pap Smear  01/19/2027    Influenza Vaccine  Completed    DEXA Scan  Completed    Fall Risk Screening (Annual)  Completed    Pneumococcal Vaccine: 50+ Years  Completed    Zoster Vaccines  Completed    Meningococcal B Vaccine  Aged Out

## 2025-01-15 ENCOUNTER — OFFICE VISIT (OUTPATIENT)
Age: 65
End: 2025-01-15
Payer: MEDICARE

## 2025-01-15 VITALS
DIASTOLIC BLOOD PRESSURE: 78 MMHG | HEIGHT: 64 IN | OXYGEN SATURATION: 96 % | BODY MASS INDEX: 21.17 KG/M2 | HEART RATE: 69 BPM | SYSTOLIC BLOOD PRESSURE: 110 MMHG | RESPIRATION RATE: 16 BRPM | WEIGHT: 124 LBS

## 2025-01-15 DIAGNOSIS — R91.8 PULMONARY INFILTRATES: ICD-10-CM

## 2025-01-15 DIAGNOSIS — R91.8 LUNG NODULES: Primary | ICD-10-CM

## 2025-01-15 DIAGNOSIS — J47.9 BRONCHIECTASIS WITHOUT ACUTE EXACERBATION (HCC): ICD-10-CM

## 2025-01-15 PROCEDURE — 99214 OFFICE O/P EST MOD 30 MIN: CPT | Performed by: INTERNAL MEDICINE

## 2025-01-15 NOTE — PATIENT INSTRUCTIONS
Use the flutter valve - see below.  Plan to obtain a CT chest in June/ July 2025 - if you get ill (sinus infection, cold, respiratory illness or other illness) you should postpone the scan for at least 4-6 weeks after you have recovered.   Call with questions/concerns      FLUTTER VALVE INSTRUCTIONS  Hold the device in your hand.  Stand or sit up tall (this is the ideal position).  For the flutter valve, the base must be level with the ground. For the acapella, this is ideal, although not essential.  Take in as deep a breath as you can.  Place your mouth on the mouthpiece.  Close your lips around the mouthpiece.  Exhale into the device. You should feel vibrations in your chest.  Repeat these steps up to 10 times without coughing. This is because each exhalation into the device helps to loosen secretions and move them up your airway.  Attempt a cough to help bring up secretion. If your cough is nonproductive, steps 1-8 can be repeated up to 3 times (or more if you are so inclined).  This should be done 2-4 times during the course of a day. If you take breathing treatments, the best time to use these devices is after your treatment. This can also be done as needed when you feel like you need to cough but can't seem to bring anything up.

## 2025-01-15 NOTE — H&P
[facility] General Pulmonary Progress Note    History of Present Illness:  Edenilson Randolph is a 64 year old female  never smoker with significant PMH of hypothyroidism, HLD who presents today for follow up of bronchiectasis and lung nodules. Since last visit she feels well. Denies cough, dyspnea or pain. Did have resp illness in Feb 2024 and since resolved. No issues now.  Did have respiratory viral exposure from children around holidays but never became ill    Dec 2023 previously  she denies new concerns today. She denies new concerns today.  Cough is improved. No dyspnea. No f/c/s.     June 2023 previously  Still with cough and thck phlegm, using flutter valve once to twice a day with improvement and expectorating phlegmm. No blood. No fevers. No dyspnea. Weight stable    April 2023 previously   She reports having URI and chest congestion about 1-2 weeks ago, was aroudn granddaughter who was ill and she became ill with URI sx then chest congestion shortly thereafter.  She proceeded to have her CT chest a few days ago showing new nodules.  She denies fevers. No dyspnea, but +thick phlegm. No blood    Previously  She reports having an evaluation for her high cholesterol leading to a calcium scoring CT scan, which incidentally demonstrated bronchiectasis and reticular opacities. She denies any history of lung disease, has had bouts of bronchitis when younger, but no recurrent infections or exacerbations. No hx of asthma or COPD. Denies any dyspnea, cough, wheeze. No autoimmune disorder with herself or family. No hx of lung disease in family. No hx of TB with herself or family.     Born/raised [facility], [place], moved to  in 1984 first to [place] x 10 years then [place]. Not aware of any of exposures to chemicals, fumes, dusts, asbestos or TB.       Past Medical History:   Past Medical History:    Allergic rhinitis    Spring seasonal allergy    High cholesterol    Hyperlipidemia    Personal history of malignant neoplasm of thyroid    Thyroid  disease    Wears glasses        Past Surgical History:   Past Surgical History:   Procedure Laterality Date    Other surgical history  01/01/2004    thyroid sx    Thyroidectomy         Family Medical History:   Family History   Problem Relation Age of Onset    Hypertension Mother     Other (CAD) Mother     Diabetes Father     Hypertension Father     Other (prostate cancer) Father     Other (thyroid cancer) Father     Cancer Father         Prostate Cancer    Hypertension Brother     Stroke Maternal Grandfather         Social History:   Social History     Socioeconomic History    Marital status:      Spouse name: Not on file    Number of children: Not on file    Years of education: Not on file    Highest education level: Not on file   Occupational History    Not on file   Tobacco Use    Smoking status: Never    Smokeless tobacco: Never   Vaping Use    Vaping status: Never Used   Substance and Sexual Activity    Alcohol use: Not Currently     Comment: Occasionally or holidays    Drug use: No    Sexual activity: Not Currently     Partners: Male   Other Topics Concern    Not on file   Social History Narrative    Not on file     Social Drivers of Health     Financial Resource Strain: Not on file   Food Insecurity: No Food Insecurity (1/13/2025)    NCSS - Food Insecurity     Worried About Running Out of Food in the Last Year: No     Ran Out of Food in the Last Year: No   Transportation Needs: No Transportation Needs (1/13/2025)    NCSS - Transportation     Lack of Transportation: No   Physical Activity: Not on file   Stress: Not on file   Social Connections: Not on file   Housing Stability: Not At Risk (1/13/2025)    NCSS - Housing/Utilities     Has Housing: Yes     Worried About Losing Housing: No     Unable to Get Utilities: No        Medications:   Current Outpatient Medications   Medication Sig Dispense Refill    levothyroxine 75 MCG Oral Tab Take 1 tablet (75 mcg total) by mouth before breakfast. 90 tablet 1     alendronate 70 MG Oral Tab Take 1 tablet (70 mg total) by mouth every 7 days. (Patient not taking: Reported on 1/15/2025) 4 tablet 3       Review of Systems: Review of Systems   Constitutional: Negative.    HENT: Negative.     Respiratory: Negative.  Negative for cough, shortness of breath, wheezing and stridor.    Cardiovascular: Negative.    All other systems reviewed and are negative.       Physical Exam:  /78 (BP Location: Right arm, Patient Position: Sitting, Cuff Size: adult)   Pulse 69   Resp 16   Ht 5' 4\" (1.626 m)   Wt 124 lb (56.2 kg)   SpO2 96%   BMI 21.28 kg/m²      Constitutional: alert, cooperative. No acute distress.  HEENT: Head NC/AT.   Cardio: Regular rate and rhythm. Normal S1 and S2. No murmurs.   Respiratory: Thorax symmetrical with no labored breathing. Clear to ausculation bilaterally with symmetrical breath sounds. No wheezing, rhonchi, rales, or crackles.   GI: NABS. Abd soft, non-tender.  Extremities: No clubbing or cyanosis. No LE edema.    Neurologic: A&Ox3. No gross motor deficits.  Skin: Warm, dry  Psych: Calm, cooperative. Pleasant affect.    Results:  Personally reviewed    WBC: 4.6, done on 1/6/2025.  HGB: 13.4, done on 1/6/2025.  PLT: 273, done on 1/6/2025.     Glucose: 93, done on 1/6/2025.  Cr: 0.71, done on 1/6/2025.  CA: 9.7, done on 1/6/2025.  Na: 142, done on 1/6/2025.  K: 3.7, done on 1/6/2025.  Cl: 105, done on 1/6/2025.  CO2: 29, done on 1/6/2025.  Last ALB was 4.4% done on 1/6/2025.     CT CHEST LD NO CAD(CPT=71250)    Result Date: 1/7/2025  CONCLUSION:  Multiple nodules, consolidations in cluster of nodules as described above.  The majority of these are stable or improved from prior examination but there are 2 nodules in the right upper lobe and right lower lobe which are new or slightly increased in size since prior examination.  Consider PET-CT or follow-up CT chest in 3 months.  LOCATION:  [facility]   Dictated by (CST): Godfrey Chen MD on 1/07/2025 at 5:16  PM     Finalized by (CST): Godfrey Chen MD on 1/07/2025 at 7:03 PM         Assessment/Plan:  #1. bronchiectasis  Found incidentally on calcium scoring CT  9/2022 Calcium scoring CT with RML/lingula bronchiectasis and peripheral based reticular opacities with some mucus plugging noted  4/2023 CT chest with new nodular opacities bilaterally and some worse from previous CT  4/2023 PFTs with mild obstruction, mild reduced DLCO  Decreased PFTs may be related to recent infection as well  Overall remains unclear etiology of bronchiectasis with negative workup including normal autoimmune eval and immunoglobulins; no previous hx or exposures  Continue airway clearance with flutter valve  Previously offered HERMELINDO to aid airway clearance, she wants to wait. 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 Had seen Dr. Frye and just monitoring for now    #2. Pulmonary nodules  Noted peripheral based reticular opacities on calcium scan  9/2022 Calcium scoring CT with RML/lingula bronchiectasis and peripheral based reticular opacities with some mucus plugging noted  Likely related to above, possible post infectious scarring, chronic infection/post infection, mucus plugging vs other  4/2023 CT chest with new nodular opacities bilaterally and some worse from previous CT  12/2023 CT chest with improvement in majority of nodules/infiltrates  1/2025 CT chest reviewed personally and noted RUL nodules.  Unable to compare personally to previous imaging at [place] due to software issues but per radiology note the RUL nodules are new and LLL nodular opacity has increased to 6mm (not centimeters)  We reviewed her imaging in detail including discussion of differential diagnoses and management. We reviewed options at this time including considering PET/CT now vs repeat CT chest in 3-6 months. We discussed the pros/cons of each. She wishes to repeat CT chest in June/July 2025 which is  reasonable    Suresh Kline MD

## 2025-02-24 ENCOUNTER — LAB ENCOUNTER (OUTPATIENT)
Dept: LAB | Facility: REFERENCE LAB | Age: 65
End: 2025-02-24
Attending: FAMILY MEDICINE
Payer: MEDICARE

## 2025-02-24 ENCOUNTER — HOSPITAL ENCOUNTER (OUTPATIENT)
Dept: MAMMOGRAPHY | Age: 65
Discharge: HOME | End: 2025-02-24
Attending: FAMILY MEDICINE
Payer: MEDICARE

## 2025-02-24 DIAGNOSIS — Z12.31 SCREENING MAMMOGRAM FOR BREAST CANCER: ICD-10-CM

## 2025-02-24 DIAGNOSIS — Z85.850 HISTORY OF THYROID CANCER: ICD-10-CM

## 2025-02-24 DIAGNOSIS — E89.0 POSTOPERATIVE HYPOTHYROIDISM: ICD-10-CM

## 2025-02-24 LAB
T4 FREE SERPL-MCNC: 1.5 NG/DL (ref 0.8–1.7)
TSI SER-ACNC: 3.84 UIU/ML (ref 0.55–4.78)

## 2025-02-24 PROCEDURE — 77067 SCR MAMMO BI INCL CAD: CPT | Performed by: FAMILY MEDICINE

## 2025-02-24 PROCEDURE — 36415 COLL VENOUS BLD VENIPUNCTURE: CPT

## 2025-02-24 PROCEDURE — 84443 ASSAY THYROID STIM HORMONE: CPT

## 2025-02-24 PROCEDURE — 77063 BREAST TOMOSYNTHESIS BI: CPT | Performed by: FAMILY MEDICINE

## 2025-02-24 PROCEDURE — 84439 ASSAY OF FREE THYROXINE: CPT

## 2025-02-24 RX ORDER — LEVOTHYROXINE SODIUM 75 UG/1
75 TABLET ORAL
Qty: 90 TABLET | Refills: 1 | OUTPATIENT
Start: 2025-02-24

## 2025-02-24 NOTE — PROGRESS NOTES
Dear Edenilson PHELPS,    Your thyroid blood work is stable.    Sincerely,  Dr. Bojorquez Spoke with patient again and explained which labs are recommended. Advised that he be evaluated by his PCP. He will contact his PCP and get back to us when he has more information.

## 2025-08-08 DIAGNOSIS — Z85.850 HISTORY OF THYROID CANCER: ICD-10-CM

## 2025-08-08 DIAGNOSIS — E89.0 POSTOPERATIVE HYPOTHYROIDISM: ICD-10-CM

## 2025-08-08 RX ORDER — LEVOTHYROXINE SODIUM 75 UG/1
75 TABLET ORAL
Qty: 90 TABLET | Refills: 1 | Status: SHIPPED | OUTPATIENT
Start: 2025-08-08

## 2025-08-10 DIAGNOSIS — Z85.850 HISTORY OF THYROID CANCER: ICD-10-CM

## 2025-08-10 DIAGNOSIS — E89.0 POSTOPERATIVE HYPOTHYROIDISM: ICD-10-CM

## 2025-08-11 RX ORDER — LEVOTHYROXINE SODIUM 75 UG/1
75 TABLET ORAL
Qty: 90 TABLET | Refills: 1 | OUTPATIENT
Start: 2025-08-11

## 2025-08-21 ENCOUNTER — HOSPITAL ENCOUNTER (OUTPATIENT)
Dept: CT IMAGING | Facility: HOSPITAL | Age: 65
Discharge: HOME | End: 2025-08-21
Attending: INTERNAL MEDICINE

## 2025-08-21 DIAGNOSIS — R91.8 LUNG NODULES: ICD-10-CM

## 2025-08-21 PROCEDURE — 71250 CT THORAX DX C-: CPT | Performed by: INTERNAL MEDICINE

## 2025-08-27 ENCOUNTER — OFFICE VISIT (OUTPATIENT)
Facility: CLINIC | Age: 65
End: 2025-08-27

## 2025-08-27 VITALS
SYSTOLIC BLOOD PRESSURE: 110 MMHG | BODY MASS INDEX: 21.79 KG/M2 | DIASTOLIC BLOOD PRESSURE: 70 MMHG | WEIGHT: 127.63 LBS | HEIGHT: 64 IN | RESPIRATION RATE: 16 BRPM | HEART RATE: 81 BPM | OXYGEN SATURATION: 98 %

## 2025-08-27 DIAGNOSIS — R91.8 PULMONARY INFILTRATES: ICD-10-CM

## 2025-08-27 DIAGNOSIS — J47.9 BRONCHIECTASIS WITHOUT ACUTE EXACERBATION (HCC): ICD-10-CM

## 2025-08-27 DIAGNOSIS — R91.8 LUNG NODULES: Primary | ICD-10-CM

## 2025-08-27 PROCEDURE — 99214 OFFICE O/P EST MOD 30 MIN: CPT | Performed by: INTERNAL MEDICINE

## (undated) NOTE — LETTER
3/18/2019    Renata Trujillo  [address] [place] [id number]    Dear Ms. Jane Richardson office has been trying to contact you to schedule a visit with your doctor to address important healthcare needs.   It is important that you contact our office at